# Patient Record
Sex: FEMALE | Race: WHITE | NOT HISPANIC OR LATINO | Employment: OTHER | URBAN - METROPOLITAN AREA
[De-identification: names, ages, dates, MRNs, and addresses within clinical notes are randomized per-mention and may not be internally consistent; named-entity substitution may affect disease eponyms.]

---

## 2017-01-01 DIAGNOSIS — N28.9 RENAL INSUFFICIENCY: Primary | ICD-10-CM

## 2017-01-10 RX ORDER — FUROSEMIDE 20 MG/1
TABLET ORAL
Qty: 45 TABLET | Refills: 0 | Status: SHIPPED | OUTPATIENT
Start: 2017-01-10 | End: 2017-06-06

## 2017-02-15 ENCOUNTER — TRANSCRIBE ORDERS (OUTPATIENT)
Dept: ADMINISTRATIVE | Facility: HOSPITAL | Age: 82
End: 2017-02-15

## 2017-02-15 DIAGNOSIS — N18.30 CHRONIC KIDNEY DISEASE, STAGE III (MODERATE) (HCC): Primary | ICD-10-CM

## 2017-02-21 ENCOUNTER — HOSPITAL ENCOUNTER (OUTPATIENT)
Dept: ULTRASOUND IMAGING | Facility: HOSPITAL | Age: 82
Discharge: HOME | End: 2017-02-21
Attending: INTERNAL MEDICINE | Admitting: INTERNAL MEDICINE

## 2017-02-21 DIAGNOSIS — N18.30 CHRONIC KIDNEY DISEASE, STAGE III (MODERATE) (HCC): ICD-10-CM

## 2017-02-21 PROCEDURE — 76775 US EXAM ABDO BACK WALL LIM: CPT

## 2017-02-26 RX ORDER — FUROSEMIDE 20 MG/1
TABLET ORAL
Qty: 45 TABLET | Refills: 0 | Status: SHIPPED | OUTPATIENT
Start: 2017-02-26 | End: 2017-06-06

## 2017-06-06 ENCOUNTER — OFFICE VISIT (OUTPATIENT)
Dept: CARDIOLOGY | Facility: CLINIC | Age: 82
End: 2017-06-06

## 2017-06-06 VITALS
SYSTOLIC BLOOD PRESSURE: 122 MMHG | BODY MASS INDEX: 24.27 KG/M2 | HEART RATE: 57 BPM | DIASTOLIC BLOOD PRESSURE: 84 MMHG | WEIGHT: 151 LBS | HEIGHT: 66 IN

## 2017-06-06 DIAGNOSIS — R42 DIZZINESS: ICD-10-CM

## 2017-06-06 DIAGNOSIS — I34.0 NON-RHEUMATIC MITRAL REGURGITATION: ICD-10-CM

## 2017-06-06 DIAGNOSIS — E78.2 MIXED HYPERLIPIDEMIA: ICD-10-CM

## 2017-06-06 DIAGNOSIS — T73.3XXD FATIGUE DUE TO EXCESSIVE EXERTION, SUBSEQUENT ENCOUNTER: ICD-10-CM

## 2017-06-06 DIAGNOSIS — R07.2 PRECORDIAL PAIN: ICD-10-CM

## 2017-06-06 DIAGNOSIS — R09.02 HYPOXEMIA: ICD-10-CM

## 2017-06-06 DIAGNOSIS — I42.9 CARDIOMYOPATHY (HCC): ICD-10-CM

## 2017-06-06 DIAGNOSIS — I25.10 CAD IN NATIVE ARTERY: ICD-10-CM

## 2017-06-06 DIAGNOSIS — I27.81 COR PULMONALE (HCC): ICD-10-CM

## 2017-06-06 DIAGNOSIS — I36.1 NON-RHEUMATIC TRICUSPID VALVE INSUFFICIENCY: ICD-10-CM

## 2017-06-06 DIAGNOSIS — I48.91 ATRIAL FIBRILLATION AND FLUTTER (HCC): Primary | ICD-10-CM

## 2017-06-06 DIAGNOSIS — I35.0 NONRHEUMATIC AORTIC VALVE STENOSIS: ICD-10-CM

## 2017-06-06 DIAGNOSIS — I24.9 ACUTE ISCHEMIC HEART DISEASE (HCC): ICD-10-CM

## 2017-06-06 DIAGNOSIS — E87.5 HYPERKALEMIA: ICD-10-CM

## 2017-06-06 DIAGNOSIS — I50.42 CHRONIC COMBINED SYSTOLIC AND DIASTOLIC CONGESTIVE HEART FAILURE (HCC): ICD-10-CM

## 2017-06-06 DIAGNOSIS — I48.21 PERMANENT ATRIAL FIBRILLATION (HCC): ICD-10-CM

## 2017-06-06 DIAGNOSIS — R26.89 IMBALANCE: ICD-10-CM

## 2017-06-06 DIAGNOSIS — I48.92 ATRIAL FIBRILLATION AND FLUTTER (HCC): Primary | ICD-10-CM

## 2017-06-06 DIAGNOSIS — I44.7 LEFT BUNDLE BRANCH BLOCK (LBBB): ICD-10-CM

## 2017-06-06 DIAGNOSIS — I50.33 ACUTE ON CHRONIC DIASTOLIC HEART FAILURE (HCC): ICD-10-CM

## 2017-06-06 DIAGNOSIS — I25.118 CORONARY ARTERY DISEASE INVOLVING NATIVE CORONARY ARTERY OF NATIVE HEART WITH OTHER FORM OF ANGINA PECTORIS (HCC): ICD-10-CM

## 2017-06-06 DIAGNOSIS — R42 FEELING FAINT: ICD-10-CM

## 2017-06-06 DIAGNOSIS — G47.33 OBSTRUCTIVE SLEEP APNEA SYNDROME: ICD-10-CM

## 2017-06-06 PROBLEM — I10 HYPERTENSION: Status: ACTIVE | Noted: 2017-06-06

## 2017-06-06 PROBLEM — R77.8 ELEVATED TROPONIN I LEVEL: Status: ACTIVE | Noted: 2017-03-29

## 2017-06-06 PROBLEM — Z51.81 ENCOUNTER FOR THERAPEUTIC DRUG LEVEL MONITORING: Status: ACTIVE | Noted: 2017-03-28

## 2017-06-06 PROBLEM — J96.01 ACUTE RESPIRATORY FAILURE WITH HYPOXIA (HCC): Status: ACTIVE | Noted: 2017-03-28

## 2017-06-06 PROBLEM — N18.9 CHRONIC KIDNEY DISEASE: Status: ACTIVE | Noted: 2017-01-01

## 2017-06-06 PROBLEM — R79.89 OTHER SPECIFIED ABNORMAL FINDINGS OF BLOOD CHEMISTRY: Status: ACTIVE | Noted: 2017-03-29

## 2017-06-06 PROCEDURE — 99214 OFFICE O/P EST MOD 30 MIN: CPT | Performed by: INTERNAL MEDICINE

## 2017-06-06 PROCEDURE — 93000 ELECTROCARDIOGRAM COMPLETE: CPT | Performed by: INTERNAL MEDICINE

## 2017-06-06 RX ORDER — ESCITALOPRAM OXALATE 5 MG/1
TABLET ORAL
COMMUNITY
Start: 2017-05-15

## 2017-06-06 NOTE — PROGRESS NOTES
[facility]  Cardiology Office Visit Note        Subjective:     Encounter Date:2017      Patient ID: Felecia Hinkle   Age: 89 y.o.  Sex: female  :  1925  MRN: [id number]             Date of Office Visit: 2017  Encounter Provider: Jeffy Valdez MD  Place of Service: [facility]     .    Chief Complaint:  History of Present Illness    The following portions of the patient's history were reviewed and updated as appropriate: allergies, current medications, past family history, past medical history, past social history, past surgical history and problem list.    Review of Systems   Constitution: Negative for chills, fever and weight gain.   HENT: Negative for congestion, headaches, hearing loss and sore throat.    Eyes: Negative for blurred vision and double vision.   Cardiovascular: Positive for leg swelling. Negative for chest pain, claudication, cyanosis, dyspnea on exertion, irregular heartbeat, near-syncope, orthopnea, palpitations, paroxysmal nocturnal dyspnea and syncope.   Respiratory: Positive for cough, shortness of breath, snoring and wheezing.    Endocrine: Negative for cold intolerance.   Hematologic/Lymphatic: Negative for adenopathy. Does not bruise/bleed easily.   Skin: Negative for rash.   Musculoskeletal: Negative for back pain.   Gastrointestinal: Positive for constipation. Negative for abdominal pain, change in bowel habit, diarrhea, nausea and vomiting.   Genitourinary: Negative for dysuria, frequency, hematuria and hesitancy.   Neurological: Positive for dizziness and loss of balance. Negative for disturbances in coordination, excessive daytime sleepiness, focal weakness and light-headedness. Numbness: Feet to Knees.   Psychiatric/Behavioral: Positive for depression. Negative for memory loss. The patient is not nervous/anxious.    Allergic/Immunologic: Negative for hives.         ECG 12 Lead  Date/Time: 2017  11:29 AM  Performed by: IVELISSE PASTRANA JR  Authorized by: IVELISSE PASTRANA JR   Comparison: compared with previous ECG   Similar to previous ECG  Rhythm: atrial fibrillation  BPM: 72  Conduction: left bundle branch block  Conduction comments: Left axis deviation  Clinical impression: abnormal ECG                       HPI     The patient is a 89-year-old white female with history of LVEF 40%, moderate to severe mitral regurgitation, severe pulmonary hypertension, moderate aortic stenosis, chronic atrial fibrillation on warfarin, hyperlipidemia, hypertension, with recent coronary stent who presents for  cardiac follow-up.  I last saw her in the office in December 2016.  At that time she was taking nitroglycerin rarely for epigastric/subxiphoid pain with relief.  Since then, she has moved in with one of her nieces, as she does not take care of herself when left alone.  When the [facility] visiting her daughters, the patient will not walk at all and therefore sustained massive edema of legs to the point of weeping.  She went to the emergency room at [facility] and March 2017 was treated with IV Lasix and lost 11 pounds.  Since then, she hasn't gained any further weight.  Next    Cardiac review systems: She has positional vertigo.  It hasn't thrown Dr. Erickson that she doesn't have a inner disorder in the past.  She is troubled with her balance.  She does have some spinning but not as bad as in the past.  She is not orthostatic by recent blood pressure and pulse.    She denies chest pain but does have a knot in her epigastrium and subxiphoid area relieved by nitroglycerin sublingua        The patient is very anxious when her nieces gone.  She was on Lexapro for a couple months ago and see me doing a little better.    No palpitations or syncope.    Cardiac risk factors: No diabetes.  Positive hypertension and hyperlipidemia.  No smoking.  Positive family history really CAD as father had MI at age 62 and brother  bypass in his 60s.              Past Medical History:   Diagnosis Date   • Acute myocardial infarction    • Congestive heart failure    • Malignant neoplasm of breast    • Vertigo        Past Surgical History:   Procedure Laterality Date   • CATARACT EXTRACTION     • CORONARY ANGIOPLASTY WITH STENT PLACEMENT     • THYROID SURGERY      SUB-TOTAL THYROIDECTOMY       Social History     Social History   • Marital status:      Spouse name: N/A   • Number of children: N/A   • Years of education: N/A     Occupational History   • Not on file.     Social History Main Topics   • Smoking status: Former Smoker   • Smokeless tobacco: Not on file   • Alcohol use No   • Drug use: Not on file   • Sexual activity: Not on file     Other Topics Concern   • Not on file     Social History Narrative   • No narrative on file       Family History   Problem Relation Age of Onset   • Thyroid disease Other    • Stroke Other      CEREBROVASCULAR ACCIDENT           Scheduled Meds:  Current Outpatient Prescriptions on File Prior to Visit   Medication Sig Dispense Refill   • acetaminophen (TYLENOL) 325 MG tablet Take 500 mg by mouth 3 (Three) Times a Day. TAKE 1 TO 2 TABLETS EVERY 6 HOURS AS NEEDED.     • aluminum-magnesium hydroxide-simethicone (MAALOX/MYLANTA) 200-200-20 MG/5ML suspension Take by mouth.     • Ascorbic Acid (VITAMIN C PO) Take by mouth.     • Calcium Carb-Cholecalciferol (CALCIUM 600 + D PO) Take 1 tablet by mouth 2 (two) times a day.     • carvedilol (COREG) 12.5 MG tablet Take 1 tablet (12.5 mg total) by mouth 2 (two) times a day.     • celecoxib (CeleBREX) 200 MG capsule Take 1 capsule by mouth daily.     • clopidogrel (PLAVIX) 75 MG tablet Take 1 tablet by mouth daily.     • furosemide (LASIX) 20 MG tablet Take 1 tablet by mouth daily. 20mg in am and 10mg at night      • Glucosamine-Chondroit-Vit C-Mn (GLUCOSAMINE CHONDR 1500 COMPLX) capsule Take 1 capsule by mouth 2 (two) times a day.     • nitroglycerin (NITROSTAT)  "0.4 MG SL tablet Place under the tongue.     • rosuvastatin (CRESTOR) 10 MG tablet Take by mouth once a week.       • spironolactone (ALDACTONE) 25 MG tablet Take 0.5 tablets (12.5 mg total) by mouth daily. 45 tablet 2   • warfarin (COUMADIN) 1 MG tablet Take 1mg Monday, Wednesday, Friday. Take 1.5mg all other days or as instructed for INR 2-3. 60 tablet 3   • warfarin (COUMADIN) 2 MG tablet Take 1 tablet by mouth daily.     • [DISCONTINUED] carvedilol (COREG) 25 MG tablet TAKE ONE TABLET BY MOUTH TWICE A  tablet 0   • [DISCONTINUED] Cyanocobalamin (VITAMIN B-12 PO) Take by mouth.     • [DISCONTINUED] diphenhydrAMINE (BENADRYL) 25 mg capsule Take by mouth. TAKE 4 NIGHTS PER WEEK.     • [DISCONTINUED] furosemide (LASIX) 20 MG tablet TAKE 1 1/2 TABLETS BY MOUTH DAILY 60 tablet 0   • [DISCONTINUED] furosemide (LASIX) 20 MG tablet TAKE ONE AND ONE-HALF TABLET BY MOUTH DAILY 45 tablet 0   • [DISCONTINUED] furosemide (LASIX) 20 MG tablet TAKE ONE AND ONE-HALF TABLET BY MOUTH DAILY 45 tablet 0   • [DISCONTINUED] furosemide (LASIX) 20 MG tablet TAKE ONE AND ONE-HALF TABLET BY MOUTH DAILY 45 tablet 0   • [DISCONTINUED] furosemide (LASIX) 20 MG tablet TAKE ONE AND ONE-HALF TABLET BY MOUTH DAILY 45 tablet 0   • [DISCONTINUED] ranolazine (RANEXA) 500 MG 12 hr tablet Take 1 tablet (500 mg total) by mouth 2 (two) times a day. 60 tablet 5     No current facility-administered medications on file prior to visit.        /84  Pulse 57  Ht 66\" (167.6 cm)  Wt 151 lb (68.5 kg)  BMI 24.37 kg/m2    Objective:     Physical Exam   Constitutional: She is oriented to person, place, and time. She appears well-developed and well-nourished. No distress.   HENT:   Head: Normocephalic and atraumatic.   Right Ear: External ear normal.   Left Ear: External ear normal.   Mouth/Throat: Oropharynx is clear and moist. No oropharyngeal exudate.   Eyes: Conjunctivae and EOM are normal. Pupils are equal, round, and reactive to light. No " scleral icterus.   Neck: Normal range of motion. Neck supple. No JVD present. No tracheal deviation present. No thyromegaly present.   Cardiovascular: Normal rate, S1 normal, S2 normal, normal heart sounds and intact distal pulses.  An irregularly irregular rhythm present. PMI is not displaced.  Exam reveals no gallop, no distant heart sounds, no friction rub and no decreased pulses.    No murmur heard.  Pulmonary/Chest: Effort normal and breath sounds normal. No accessory muscle usage. No respiratory distress. She has no wheezes. She has no rales. She exhibits no tenderness.   Abdominal: Soft. Bowel sounds are normal. She exhibits no distension and no mass. There is no tenderness. There is no rebound and no guarding.   Musculoskeletal: Normal range of motion. She exhibits no edema, tenderness or deformity.   Lymphadenopathy:     She has no cervical adenopathy.   Neurological: She is alert and oriented to person, place, and time. She has normal reflexes. No cranial nerve deficit. Coordination normal.   Skin: Skin is dry. No rash noted. She is not diaphoretic. No erythema. No pallor.   Psychiatric: She has a normal mood and affect.             Lab Review:               Lab Review:         Lab Review     Lab Results   Component Value Date    CHOL 161 12/20/2016     Lab Results   Component Value Date    HDL 46 12/20/2016    HDL 48 08/26/2014     Lab Results   Component Value Date    LDL 85 08/26/2014     Lab Results   Component Value Date    TRIG 109 12/20/2016    TRIG 109 08/26/2014     No components found for: CHOLHDL  Lab Results   Component Value Date    GLUCOSE 69 12/20/2016    BUN 40 (H) 12/20/2016    CREATININE 1.94 (H) 12/20/2016    EGFRIFNONA 24 (L) 12/20/2016    BCR 20.6 12/20/2016    CO2 24.3 12/20/2016    CALCIUM 10.0 (H) 12/20/2016    ALBUMIN 4.30 12/20/2016    LABIL2 1.7 12/20/2016    AST 16 12/20/2016    ALT 5 12/20/2016     Lab Results   Component Value Date    GLUCOSE 69 12/20/2016    CALCIUM 10.0 (H)  12/20/2016     12/20/2016    K 5.7 (H) 12/20/2016    CO2 24.3 12/20/2016    CL 99 12/20/2016    BUN 40 (H) 12/20/2016    CREATININE 1.94 (H) 12/20/2016    EGFRIFNONA 24 (L) 12/20/2016    BCR 20.6 12/20/2016    ANIONGAP 13.7 12/20/2016     Lab Results   Component Value Date    WBC 5.57 12/20/2016    HGB 12.3 12/20/2016    HCT 38.9 12/20/2016    .4 (H) 12/20/2016     12/20/2016     Lab Results   Component Value Date    DDIMER 1468 (H) 08/26/2014     Lab Results   Component Value Date    TSH 1.620 12/20/2016     Lab Results   Component Value Date    CKTOTAL 51 12/20/2016     No results found for: DIGOXIN  Lab Results   Component Value Date    CKTOTAL 51 12/20/2016    CKMB 2.61 12/20/2016    TROPONINT 0.04 (H) 03/14/2015     Lab Results   Component Value Date    INR 2.4 (H) 01/26/2016    INR 2.9 (H) 11/26/2015    INR 2.5 (H) 03/15/2015    PROTIME 26.2 (H) 01/26/2016    PROTIME 30.6 (H) 11/26/2015    PROTIME 28.0 (H) 03/15/2015     CrCl cannot be calculated (Patient's most recent sCr result is older than the maximum 30 days allowed.).    Assessment:          Diagnosis Plan   1. Atrial fibrillation and flutter  ECG 12 Lead    Adult Transthoracic Echo Complete    CBC & Differential    CK    CK-MB    Comprehensive Metabolic Panel    D-dimer, Quantitative    Magnesium    proBNP    Sedimentation Rate    T3, Free    TSH    T4, free    Troponin T   2. CAD in native artery  ECG 12 Lead   3. Acute ischemic heart disease     4. Acute on chronic diastolic heart failure     5. Nonrheumatic aortic valve stenosis  Adult Transthoracic Echo Complete    CBC & Differential    CK    CK-MB    Comprehensive Metabolic Panel    D-dimer, Quantitative    Magnesium    Lipid Panel    proBNP    Sedimentation Rate    T3, Free    TSH    T4, free    Troponin T   6. Cardiomyopathy     7. Chronic combined systolic and diastolic congestive heart failure     8. Cor pulmonale     9. Coronary artery disease involving native coronary  artery of native heart with other form of angina pectoris     10. Left bundle branch block (LBBB)     11. Non-rheumatic mitral regurgitation     12. Non-rheumatic tricuspid valve insufficiency     13. Permanent atrial fibrillation  Adult Transthoracic Echo Complete    CBC & Differential    CK    CK-MB    Comprehensive Metabolic Panel    D-dimer, Quantitative    Magnesium    Lipid Panel    proBNP    Sedimentation Rate    T3, Free    TSH    T4, free    Troponin T   14. Precordial pain     15. Obstructive sleep apnea syndrome     16. Dizziness     17. Fatigue due to excessive exertion, subsequent encounter     18. Feeling faint     19. Hyperkalemia     20. Hypoxemia     21. Imbalance     22. Mixed hyperlipidemia  Lipid Panel          Assessment and Plan:    Felecia was seen today for atrial fibrillation, coronary artery disease and congestive heart failure.    Diagnoses and all orders for this visit:    Atrial fibrillation and flutter  -     ECG 12 Lead  -     Adult Transthoracic Echo Complete  -     CBC & Differential  -     CK  -     CK-MB  -     Comprehensive Metabolic Panel  -     D-dimer, Quantitative  -     Magnesium  -     proBNP  -     Sedimentation Rate  -     T3, Free  -     TSH  -     T4, free  -     Troponin T    CAD in native artery  -     ECG 12 Lead    Acute ischemic heart disease    Acute on chronic diastolic heart failure    Nonrheumatic aortic valve stenosis  -     Adult Transthoracic Echo Complete  -     CBC & Differential  -     CK  -     CK-MB  -     Comprehensive Metabolic Panel  -     D-dimer, Quantitative  -     Magnesium  -     Lipid Panel  -     proBNP  -     Sedimentation Rate  -     T3, Free  -     TSH  -     T4, free  -     Troponin T    Cardiomyopathy    Chronic combined systolic and diastolic congestive heart failure    Cor pulmonale    Coronary artery disease involving native coronary artery of native heart with other form of angina pectoris    Left bundle branch block  (LBBB)    Non-rheumatic mitral regurgitation    Non-rheumatic tricuspid valve insufficiency    Permanent atrial fibrillation  -     Adult Transthoracic Echo Complete  -     CBC & Differential  -     CK  -     CK-MB  -     Comprehensive Metabolic Panel  -     D-dimer, Quantitative  -     Magnesium  -     Lipid Panel  -     proBNP  -     Sedimentation Rate  -     T3, Free  -     TSH  -     T4, free  -     Troponin T    Precordial pain    Obstructive sleep apnea syndrome    Dizziness    Fatigue due to excessive exertion, subsequent encounter    Feeling faint    Hyperkalemia    Hypoxemia    Imbalance    Mixed hyperlipidemia  -     Lipid Panel    The patient's legs are much better, with only mild edema wearing compression stockings.  Her jugular venous pressure is normal and I don't think she is vomiting overload.  she does have crackles in her bases and likely has an element of COPD particularly as recent chest x-ray showed chronic lumbar changes.  She does do pursed lip breathing on occasion.    She does complain of pain in her epigastrium/subxiphoid area relieved by nitroglycerin sublingual.  I told her she can take this.  I will also prescribe her Pepcid 40 moderate MDI to see if that relieves epigastric pain.  She does have a lot of indigestion.  She also takes Tums with relief.    I told the patient that she needs to exercise [place] today ran this sitting in a chair or she will regain the pedal edema.    She does not have [place] food.  Her sodium levels low and her blood, according to the daughter.    Atrial fibrillation rate is 72 today, controlled.  Remains on Coumadin.    I will obtain echocardiogram to evaluate her recent admission to emergency room with heart failure.  I will also need to obtain blood work including proBNP as it has been done since December 2016.  She check her potassium and sodium level at that time too..      A total of 25 minutes was spent in the care of this patient, including at least 13  minutes face-to-face with the patient.    I not only counseled the patient today on the significant factors noted in the assessment and plan, but I also recommended that the patient reduce salt and saturated animal fat intake in diet, as well as to perform scheduled exercise on a regular basis.      Plan:                  06/06/2017  1:53 PM  MD Jeffy Sequeira MD  6/6/2017, 1:53 PM    EMR Dragon/Transcription disclaimer:   Much of this encounter note is an electronic transcription/translation of spoken language to printed text. The electronic translation of spoken language may permit erroneous, or at times, nonsensical words or phrases to be inadvertently transcribed; Although I have reviewed the note for such errors, some may still exist.

## 2017-06-14 ENCOUNTER — HOSPITAL ENCOUNTER (OUTPATIENT)
Dept: CARDIOLOGY | Facility: HOSPITAL | Age: 82
Discharge: HOME | End: 2017-06-14

## 2017-06-14 ENCOUNTER — HOSPITAL ENCOUNTER (OUTPATIENT)
Dept: CARDIOLOGY | Facility: HOSPITAL | Age: 82
Discharge: HOME | End: 2017-06-14
Attending: INTERNAL MEDICINE | Admitting: INTERNAL MEDICINE

## 2017-06-14 VITALS
BODY MASS INDEX: 24.27 KG/M2 | WEIGHT: 151 LBS | SYSTOLIC BLOOD PRESSURE: 122 MMHG | HEIGHT: 66 IN | DIASTOLIC BLOOD PRESSURE: 84 MMHG

## 2017-06-14 LAB
ALBUMIN SERPL-MCNC: 4 G/DL (ref 3.5–5.2)
ALBUMIN/GLOB SERPL: 1.5 G/DL
ALP SERPL-CCNC: 87 U/L (ref 39–117)
ALT SERPL W P-5'-P-CCNC: 9 U/L (ref 1–33)
ANION GAP SERPL CALCULATED.3IONS-SCNC: 14.3 MMOL/L
AST SERPL-CCNC: 14 U/L (ref 1–32)
BASOPHILS # BLD AUTO: 0.03 10*3/MM3 (ref 0–0.2)
BASOPHILS NFR BLD AUTO: 0.6 % (ref 0–1.5)
BH CV ECHO MEAS - ACS: 1.7 CM
BH CV ECHO MEAS - AI DEC SLOPE: 209 CM/SEC^2
BH CV ECHO MEAS - AI MAX PG: 48.7 MMHG
BH CV ECHO MEAS - AI MAX VEL: 349 CM/SEC
BH CV ECHO MEAS - AI P1/2T: 489.1 MSEC
BH CV ECHO MEAS - AO MAX PG (FULL): 3.3 MMHG
BH CV ECHO MEAS - AO MAX PG: 5.2 MMHG
BH CV ECHO MEAS - AO MEAN PG (FULL): 2 MMHG
BH CV ECHO MEAS - AO MEAN PG: 3 MMHG
BH CV ECHO MEAS - AO ROOT AREA (BSA CORRECTED): 1.9
BH CV ECHO MEAS - AO ROOT AREA: 8.6 CM^2
BH CV ECHO MEAS - AO ROOT DIAM: 3.3 CM
BH CV ECHO MEAS - AO V2 MAX: 114 CM/SEC
BH CV ECHO MEAS - AO V2 MEAN: 83.2 CM/SEC
BH CV ECHO MEAS - AO V2 VTI: 20.2 CM
BH CV ECHO MEAS - AVA(I,A): 2 CM^2
BH CV ECHO MEAS - AVA(I,D): 2 CM^2
BH CV ECHO MEAS - AVA(V,A): 1.9 CM^2
BH CV ECHO MEAS - AVA(V,D): 1.9 CM^2
BH CV ECHO MEAS - BSA(HAYCOCK): 1.8 M^2
BH CV ECHO MEAS - BSA: 1.8 M^2
BH CV ECHO MEAS - BZI_BMI: 24.4 KILOGRAMS/M^2
BH CV ECHO MEAS - BZI_METRIC_HEIGHT: 167.6 CM
BH CV ECHO MEAS - BZI_METRIC_WEIGHT: 68.5 KG
BH CV ECHO MEAS - CONTRAST EF 4CH: 59.8 ML/M^2
BH CV ECHO MEAS - EDV(CUBED): 74.1 ML
BH CV ECHO MEAS - EDV(MOD-SP4): 82 ML
BH CV ECHO MEAS - EDV(TEICH): 78.6 ML
BH CV ECHO MEAS - EF(CUBED): 63.6 %
BH CV ECHO MEAS - EF(MOD-SP4): 59.8 %
BH CV ECHO MEAS - EF(TEICH): 55.5 %
BH CV ECHO MEAS - ESV(CUBED): 27 ML
BH CV ECHO MEAS - ESV(MOD-SP4): 33 ML
BH CV ECHO MEAS - ESV(TEICH): 35 ML
BH CV ECHO MEAS - FS: 28.6 %
BH CV ECHO MEAS - IVS/LVPW: 1
BH CV ECHO MEAS - IVSD: 1.2 CM
BH CV ECHO MEAS - LA DIMENSION: 4.9 CM
BH CV ECHO MEAS - LA/AO: 1.5
BH CV ECHO MEAS - LAT PEAK E' VEL: 11.7 CM/SEC
BH CV ECHO MEAS - LV DIASTOLIC VOL/BSA (35-75): 46.2 ML/M^2
BH CV ECHO MEAS - LV MASS(C)D: 178.2 GRAMS
BH CV ECHO MEAS - LV MASS(C)DI: 100.4 GRAMS/M^2
BH CV ECHO MEAS - LV MAX PG: 1.9 MMHG
BH CV ECHO MEAS - LV MEAN PG: 1 MMHG
BH CV ECHO MEAS - LV SYSTOLIC VOL/BSA (12-30): 18.6 ML/M^2
BH CV ECHO MEAS - LV V1 MAX: 68.7 CM/SEC
BH CV ECHO MEAS - LV V1 MEAN: 52.4 CM/SEC
BH CV ECHO MEAS - LV V1 VTI: 12.8 CM
BH CV ECHO MEAS - LVIDD: 4.2 CM
BH CV ECHO MEAS - LVIDS: 3 CM
BH CV ECHO MEAS - LVLD AP4: 6.6 CM
BH CV ECHO MEAS - LVLS AP4: 5.9 CM
BH CV ECHO MEAS - LVOT AREA (M): 3.1 CM^2
BH CV ECHO MEAS - LVOT AREA: 3.1 CM^2
BH CV ECHO MEAS - LVOT DIAM: 2 CM
BH CV ECHO MEAS - LVPWD: 1.2 CM
BH CV ECHO MEAS - MED PEAK E' VEL: 5.2 CM/SEC
BH CV ECHO MEAS - MR MAX PG: 87.2 MMHG
BH CV ECHO MEAS - MR MAX VEL: 467 CM/SEC
BH CV ECHO MEAS - MV A DUR: 0.2 SEC
BH CV ECHO MEAS - MV A MAX VEL: 47.5 CM/SEC
BH CV ECHO MEAS - MV DEC SLOPE: 709 CM/SEC^2
BH CV ECHO MEAS - MV DEC TIME: 0.12 SEC
BH CV ECHO MEAS - MV E MAX VEL: 113 CM/SEC
BH CV ECHO MEAS - MV E/A: 2.4
BH CV ECHO MEAS - MV MAX PG: 5.5 MMHG
BH CV ECHO MEAS - MV MEAN PG: 1 MMHG
BH CV ECHO MEAS - MV P1/2T MAX VEL: 120 CM/SEC
BH CV ECHO MEAS - MV P1/2T: 49.6 MSEC
BH CV ECHO MEAS - MV V2 MAX: 117 CM/SEC
BH CV ECHO MEAS - MV V2 MEAN: 44.5 CM/SEC
BH CV ECHO MEAS - MV V2 VTI: 35.5 CM
BH CV ECHO MEAS - MVA P1/2T LCG: 1.8 CM^2
BH CV ECHO MEAS - MVA(P1/2T): 4.4 CM^2
BH CV ECHO MEAS - MVA(VTI): 1.1 CM^2
BH CV ECHO MEAS - PA MAX PG (FULL): 2.1 MMHG
BH CV ECHO MEAS - PA MAX PG: 2.5 MMHG
BH CV ECHO MEAS - PA V2 MAX: 78.5 CM/SEC
BH CV ECHO MEAS - PULM A REVS DUR: 0.14 SEC
BH CV ECHO MEAS - PULM A REVS VEL: 21.8 CM/SEC
BH CV ECHO MEAS - PULM DIAS VEL: 51.9 CM/SEC
BH CV ECHO MEAS - PULM S/D: 0.83
BH CV ECHO MEAS - PULM SYS VEL: 43 CM/SEC
BH CV ECHO MEAS - PVA(V,A): 1.4 CM^2
BH CV ECHO MEAS - PVA(V,D): 1.4 CM^2
BH CV ECHO MEAS - QP/QS: 0.49
BH CV ECHO MEAS - RAP SYSTOLE: 10 MMHG
BH CV ECHO MEAS - RV MAX PG: 0.35 MMHG
BH CV ECHO MEAS - RV MEAN PG: 0 MMHG
BH CV ECHO MEAS - RV V1 MAX: 29.6 CM/SEC
BH CV ECHO MEAS - RV V1 MEAN: 20.4 CM/SEC
BH CV ECHO MEAS - RV V1 VTI: 5.2 CM
BH CV ECHO MEAS - RVDD: 3.3 CM
BH CV ECHO MEAS - RVOT AREA: 3.8 CM^2
BH CV ECHO MEAS - RVOT DIAM: 2.2 CM
BH CV ECHO MEAS - RVSP: 82.3 MMHG
BH CV ECHO MEAS - SI(AO): 97.3 ML/M^2
BH CV ECHO MEAS - SI(CUBED): 26.5 ML/M^2
BH CV ECHO MEAS - SI(LVOT): 22.7 ML/M^2
BH CV ECHO MEAS - SI(MOD-SP4): 27.6 ML/M^2
BH CV ECHO MEAS - SI(TEICH): 24.6 ML/M^2
BH CV ECHO MEAS - SV(AO): 172.8 ML
BH CV ECHO MEAS - SV(CUBED): 47.1 ML
BH CV ECHO MEAS - SV(LVOT): 40.2 ML
BH CV ECHO MEAS - SV(MOD-SP4): 49 ML
BH CV ECHO MEAS - SV(RVOT): 19.7 ML
BH CV ECHO MEAS - SV(TEICH): 43.6 ML
BH CV ECHO MEAS - TAPSE (>1.6): 1.6 CM2
BH CV ECHO MEAS - TR MAX VEL: 425 CM/SEC
BH CV XLRA - RV BASE: 3.8 CM
BH CV XLRA - RV LENGTH: 5.5 CM
BH CV XLRA - RV MID: 3.4 CM
BH CV XLRA - TDI S': 7.4 CM/SEC
BILIRUB SERPL-MCNC: 0.6 MG/DL (ref 0.1–1.2)
BUN BLD-MCNC: 37 MG/DL (ref 8–23)
BUN/CREAT SERPL: 19.4 (ref 7–25)
CALCIUM SPEC-SCNC: 9.8 MG/DL (ref 8.2–9.6)
CHLORIDE SERPL-SCNC: 96 MMOL/L (ref 98–107)
CHOLEST SERPL-MCNC: 144 MG/DL (ref 0–200)
CK MB SERPL-CCNC: 2.2 NG/ML
CK SERPL-CCNC: 31 U/L (ref 20–180)
CO2 SERPL-SCNC: 21.7 MMOL/L (ref 22–29)
CREAT BLD-MCNC: 1.91 MG/DL (ref 0.57–1)
D DIMER PPP FEU-MCNC: 2.72 MCGFEU/ML (ref 0–0.49)
DEPRECATED RDW RBC AUTO: 60.7 FL (ref 37–54)
EOSINOPHIL # BLD AUTO: 0.24 10*3/MM3 (ref 0–0.7)
EOSINOPHIL NFR BLD AUTO: 4.5 % (ref 0.3–6.2)
ERYTHROCYTE [DISTWIDTH] IN BLOOD BY AUTOMATED COUNT: 18.5 % (ref 11.7–13)
ERYTHROCYTE [SEDIMENTATION RATE] IN BLOOD: 15 MM/HR (ref 0–30)
GFR SERPL CREATININE-BSD FRML MDRD: 25 ML/MIN/1.73
GLOBULIN UR ELPH-MCNC: 2.7 GM/DL
GLUCOSE BLD-MCNC: 77 MG/DL (ref 65–99)
HCT VFR BLD AUTO: 35.5 % (ref 35.6–45.5)
HDLC SERPL-MCNC: 47 MG/DL (ref 40–60)
HGB BLD-MCNC: 11.7 G/DL (ref 11.9–15.5)
IMM GRANULOCYTES # BLD: 0 10*3/MM3 (ref 0–0.03)
IMM GRANULOCYTES NFR BLD: 0 % (ref 0–0.5)
LDLC SERPL CALC-MCNC: 85 MG/DL (ref 0–100)
LDLC/HDLC SERPL: 1.8 {RATIO}
LEFT ATRIUM VOLUME INDEX: 94.4 ML/M2
LV EF 2D ECHO EST: 50 %
LYMPHOCYTES # BLD AUTO: 0.92 10*3/MM3 (ref 0.9–4.8)
LYMPHOCYTES NFR BLD AUTO: 17.3 % (ref 19.6–45.3)
MAGNESIUM SERPL-MCNC: 2.2 MG/DL (ref 1.7–2.3)
MCH RBC QN AUTO: 29.3 PG (ref 26.9–32)
MCHC RBC AUTO-ENTMCNC: 33 G/DL (ref 32.4–36.3)
MCV RBC AUTO: 89 FL (ref 80.5–98.2)
MONOCYTES # BLD AUTO: 0.62 10*3/MM3 (ref 0.2–1.2)
MONOCYTES NFR BLD AUTO: 11.7 % (ref 5–12)
NEUTROPHILS # BLD AUTO: 3.51 10*3/MM3 (ref 1.9–8.1)
NEUTROPHILS NFR BLD AUTO: 65.9 % (ref 42.7–76)
NT-PROBNP SERPL-MCNC: 7595 PG/ML (ref 0–1800)
PLATELET # BLD AUTO: 154 10*3/MM3 (ref 140–500)
PMV BLD AUTO: 10.3 FL (ref 6–12)
POTASSIUM BLD-SCNC: 5.2 MMOL/L (ref 3.5–5.2)
PROT SERPL-MCNC: 6.7 G/DL (ref 6–8.5)
RBC # BLD AUTO: 3.99 10*6/MM3 (ref 3.9–5.2)
SODIUM BLD-SCNC: 132 MMOL/L (ref 136–145)
T3FREE SERPL-MCNC: 2.33 PG/ML (ref 2–4.4)
T4 FREE SERPL-MCNC: 1.43 NG/DL (ref 0.93–1.7)
TRIGL SERPL-MCNC: 61 MG/DL (ref 0–150)
TSH SERPL DL<=0.05 MIU/L-ACNC: 1.14 MIU/ML (ref 0.27–4.2)
VLDLC SERPL-MCNC: 12.2 MG/DL (ref 5–40)
WBC NRBC COR # BLD: 5.32 10*3/MM3 (ref 4.5–10.7)

## 2017-06-14 PROCEDURE — 93306 TTE W/DOPPLER COMPLETE: CPT | Performed by: INTERNAL MEDICINE

## 2017-06-14 PROCEDURE — 80053 COMPREHEN METABOLIC PANEL: CPT | Performed by: INTERNAL MEDICINE

## 2017-06-14 PROCEDURE — 82553 CREATINE MB FRACTION: CPT | Performed by: INTERNAL MEDICINE

## 2017-06-14 PROCEDURE — 84439 ASSAY OF FREE THYROXINE: CPT | Performed by: INTERNAL MEDICINE

## 2017-06-14 PROCEDURE — 83735 ASSAY OF MAGNESIUM: CPT | Performed by: INTERNAL MEDICINE

## 2017-06-14 PROCEDURE — 85379 FIBRIN DEGRADATION QUANT: CPT | Performed by: INTERNAL MEDICINE

## 2017-06-14 PROCEDURE — 80061 LIPID PANEL: CPT | Performed by: INTERNAL MEDICINE

## 2017-06-14 PROCEDURE — 84443 ASSAY THYROID STIM HORMONE: CPT | Performed by: INTERNAL MEDICINE

## 2017-06-14 PROCEDURE — 93306 TTE W/DOPPLER COMPLETE: CPT

## 2017-06-14 PROCEDURE — 82550 ASSAY OF CK (CPK): CPT | Performed by: INTERNAL MEDICINE

## 2017-06-14 PROCEDURE — 36415 COLL VENOUS BLD VENIPUNCTURE: CPT | Performed by: INTERNAL MEDICINE

## 2017-06-14 PROCEDURE — 85652 RBC SED RATE AUTOMATED: CPT | Performed by: INTERNAL MEDICINE

## 2017-06-14 PROCEDURE — 84481 FREE ASSAY (FT-3): CPT | Performed by: INTERNAL MEDICINE

## 2017-06-14 PROCEDURE — 85025 COMPLETE CBC W/AUTO DIFF WBC: CPT | Performed by: INTERNAL MEDICINE

## 2017-06-14 PROCEDURE — 83880 ASSAY OF NATRIURETIC PEPTIDE: CPT | Performed by: INTERNAL MEDICINE

## 2017-06-19 ENCOUNTER — TELEPHONE (OUTPATIENT)
Dept: CARDIOLOGY | Facility: CLINIC | Age: 82
End: 2017-06-19

## 2017-06-19 ENCOUNTER — TELEPHONE (OUTPATIENT)
Dept: CARDIOLOGY | Facility: HOSPITAL | Age: 82
End: 2017-06-19

## 2017-06-20 NOTE — TELEPHONE ENCOUNTER
Echocardiogram shows LVEF 50%, mild LVH, mild to moderate cor pulmonale with severe pulmonary hypertension.  Moderate mitral regurgitation and tricuspid regurgitation.  No change from previous echocardiogram In December 2016.    Labs are notable for elevated proBNP likely from right heart dysfunction and severe pulmonary hypertension and renal dysfunction.  Continue present treatment.    Excellent cholesterol profile.

## 2017-06-20 NOTE — TELEPHONE ENCOUNTER
Echocardiogram shows LVEF 50%, mild LVH, mild to moderate cor pulmonale with severe pulmonary hypertension. Moderate mitral regurgitation and tricuspid regurgitation. No change from previous echocardiogram In December 2016.     Labs are notable for elevated proBNP likely from right heart dysfunction and severe pulmonary hypertension and renal dysfunction. Continue present treatment.     Excellent cholesterol profile.    I also gave this to Syl to call patient.

## 2017-08-16 RX ORDER — WARFARIN SODIUM 2 MG/1
2 TABLET ORAL DAILY
Qty: 90 TABLET | Refills: 1 | Status: SHIPPED | OUTPATIENT
Start: 2017-08-16 | End: 2018-06-06

## 2017-09-13 LAB — INR PPP: 2.3

## 2017-09-14 ENCOUNTER — ANTICOAGULATION VISIT (OUTPATIENT)
Dept: CARDIOLOGY | Facility: CLINIC | Age: 82
End: 2017-09-14

## 2017-09-20 LAB — INR PPP: 1.8

## 2017-09-21 ENCOUNTER — ANTICOAGULATION VISIT (OUTPATIENT)
Dept: CARDIOLOGY | Facility: CLINIC | Age: 82
End: 2017-09-21

## 2017-09-27 ENCOUNTER — APPOINTMENT (OUTPATIENT)
Dept: GENERAL RADIOLOGY | Facility: HOSPITAL | Age: 82
End: 2017-09-27

## 2017-09-27 ENCOUNTER — HOSPITAL ENCOUNTER (EMERGENCY)
Facility: HOSPITAL | Age: 82
Discharge: HOME | End: 2017-09-27
Attending: EMERGENCY MEDICINE | Admitting: EMERGENCY MEDICINE

## 2017-09-27 ENCOUNTER — APPOINTMENT (OUTPATIENT)
Dept: CT IMAGING | Facility: HOSPITAL | Age: 82
End: 2017-09-27

## 2017-09-27 VITALS
SYSTOLIC BLOOD PRESSURE: 155 MMHG | TEMPERATURE: 97.7 F | DIASTOLIC BLOOD PRESSURE: 86 MMHG | RESPIRATION RATE: 18 BRPM | WEIGHT: 141 LBS | HEART RATE: 70 BPM | HEIGHT: 66 IN | OXYGEN SATURATION: 98 % | BODY MASS INDEX: 22.66 KG/M2

## 2017-09-27 DIAGNOSIS — S40.012A CONTUSION OF LEFT SHOULDER, INITIAL ENCOUNTER: ICD-10-CM

## 2017-09-27 DIAGNOSIS — S00.03XA CONTUSION OF SCALP, INITIAL ENCOUNTER: Primary | ICD-10-CM

## 2017-09-27 DIAGNOSIS — S70.02XA CONTUSION OF LEFT HIP, INITIAL ENCOUNTER: ICD-10-CM
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
## 2017-09-27 DIAGNOSIS — S60.222A CONTUSION OF LEFT HAND, INITIAL ENCOUNTER: ICD-10-CM

## 2017-09-27 LAB
INR PPP: 2.64 (ref 0.9–1.1)
PROTHROMBIN TIME: 27.3 SECONDS (ref 11.7–14.2)

## 2017-09-27 PROCEDURE — 85610 PROTHROMBIN TIME: CPT | Performed by: PHYSICIAN ASSISTANT

## 2017-09-27 PROCEDURE — 73030 X-RAY EXAM OF SHOULDER: CPT

## 2017-09-27 PROCEDURE — 73502 X-RAY EXAM HIP UNI 2-3 VIEWS: CPT

## 2017-09-27 PROCEDURE — 73130 X-RAY EXAM OF HAND: CPT

## 2017-09-27 PROCEDURE — 70450 CT HEAD/BRAIN W/O DYE: CPT

## 2017-09-27 PROCEDURE — 99284 EMERGENCY DEPT VISIT MOD MDM: CPT

## 2017-09-27 RX ORDER — ACETAMINOPHEN 500 MG
500 TABLET ORAL ONCE
Status: COMPLETED | OUTPATIENT
Start: 2017-09-27 | End: 2017-09-27

## 2017-09-27 RX ADMIN — ACETAMINOPHEN 500 MG: 500 TABLET ORAL at 15:09

## 2017-09-28 ENCOUNTER — ANTICOAGULATION VISIT (OUTPATIENT)
Dept: CARDIOLOGY | Facility: CLINIC | Age: 82
End: 2017-09-28

## 2017-09-28 ENCOUNTER — EPISODE CHANGES (OUTPATIENT)
Dept: CASE MANAGEMENT | Facility: OTHER | Age: 82
End: 2017-09-28

## 2017-10-04 ENCOUNTER — PATIENT OUTREACH (OUTPATIENT)
Dept: CASE MANAGEMENT | Facility: OTHER | Age: 82
End: 2017-10-04

## 2017-10-04 ENCOUNTER — EPISODE CHANGES (OUTPATIENT)
Dept: CASE MANAGEMENT | Facility: OTHER | Age: 82
End: 2017-10-04

## 2017-10-04 NOTE — OUTREACH NOTE
Outreach with caregiver/niece, Yareli who reports patient is back to her normal state.  Explained HRCM and discussed preventive care with caregiver, including flu vaccine she usually gets in October/November.  No questions or concerns at this time.  Will continue to monitor and outreach as needed.

## 2017-10-11 LAB — INR PPP: 2.3

## 2017-10-17 ENCOUNTER — ANTICOAGULATION VISIT (OUTPATIENT)
Dept: CARDIOLOGY | Facility: CLINIC | Age: 82
End: 2017-10-17

## 2017-10-18 ENCOUNTER — ANTICOAGULATION VISIT (OUTPATIENT)
Dept: CARDIOLOGY | Age: 82
End: 2017-10-18

## 2017-10-18 LAB — INR PPP: 2.9

## 2017-10-25 ENCOUNTER — ANTICOAGULATION VISIT (OUTPATIENT)
Dept: CARDIOLOGY | Facility: CLINIC | Age: 82
End: 2017-10-25

## 2017-10-25 LAB — INR PPP: 2.5

## 2017-11-01 LAB — INR PPP: 2.8

## 2017-11-07 ENCOUNTER — ANTICOAGULATION VISIT (OUTPATIENT)
Dept: CARDIOLOGY | Facility: CLINIC | Age: 82
End: 2017-11-07

## 2017-11-08 ENCOUNTER — ANTICOAGULATION VISIT (OUTPATIENT)
Dept: CARDIOLOGY | Facility: CLINIC | Age: 82
End: 2017-11-08

## 2017-11-08 LAB — INR PPP: 2.3

## 2017-11-13 DIAGNOSIS — I48.20 CHRONIC ATRIAL FIBRILLATION (HCC): ICD-10-CM

## 2017-11-13 RX ORDER — WARFARIN SODIUM 1 MG/1
TABLET ORAL
Qty: 60 TABLET | Refills: 3 | Status: SHIPPED | OUTPATIENT
Start: 2017-11-13 | End: 2017-11-15 | Stop reason: SDUPTHER

## 2017-11-15 ENCOUNTER — ANTICOAGULATION VISIT (OUTPATIENT)
Dept: CARDIOLOGY | Facility: CLINIC | Age: 82
End: 2017-11-15

## 2017-11-15 LAB — INR PPP: 3.1

## 2017-11-15 RX ORDER — WARFARIN SODIUM 1 MG/1
TABLET ORAL
Qty: 60 TABLET | Refills: 3 | Status: SHIPPED | OUTPATIENT
Start: 2017-11-15 | End: 2018-06-06

## 2017-11-21 LAB — INR PPP: 1.9

## 2017-11-22 ENCOUNTER — ANTICOAGULATION VISIT (OUTPATIENT)
Dept: CARDIOLOGY | Facility: CLINIC | Age: 82
End: 2017-11-22

## 2017-11-29 ENCOUNTER — ANTICOAGULATION VISIT (OUTPATIENT)
Dept: CARDIOLOGY | Facility: CLINIC | Age: 82
End: 2017-11-29

## 2017-11-29 LAB — INR PPP: 1.6

## 2017-12-06 ENCOUNTER — OFFICE VISIT (OUTPATIENT)
Dept: CARDIOLOGY | Age: 82
End: 2017-12-06

## 2017-12-06 ENCOUNTER — ANTICOAGULATION VISIT (OUTPATIENT)
Dept: CARDIOLOGY | Facility: CLINIC | Age: 82
End: 2017-12-06

## 2017-12-06 VITALS
SYSTOLIC BLOOD PRESSURE: 119 MMHG | BODY MASS INDEX: 22.66 KG/M2 | HEIGHT: 66 IN | WEIGHT: 141 LBS | HEART RATE: 76 BPM | DIASTOLIC BLOOD PRESSURE: 75 MMHG

## 2017-12-06 DIAGNOSIS — I42.0 DILATED CARDIOMYOPATHY (HCC): ICD-10-CM

## 2017-12-06 DIAGNOSIS — I48.19 PERSISTENT ATRIAL FIBRILLATION (HCC): Primary | ICD-10-CM

## 2017-12-06 DIAGNOSIS — I44.7 LEFT BUNDLE BRANCH BLOCK (LBBB): ICD-10-CM

## 2017-12-06 DIAGNOSIS — I35.0 NONRHEUMATIC AORTIC VALVE STENOSIS: ICD-10-CM

## 2017-12-06 DIAGNOSIS — I48.21 PERMANENT ATRIAL FIBRILLATION (HCC): ICD-10-CM

## 2017-12-06 LAB — INR PPP: 1.6

## 2017-12-06 PROCEDURE — 93000 ELECTROCARDIOGRAM COMPLETE: CPT | Performed by: INTERNAL MEDICINE

## 2017-12-06 PROCEDURE — 99213 OFFICE O/P EST LOW 20 MIN: CPT | Performed by: INTERNAL MEDICINE

## 2017-12-06 NOTE — PROGRESS NOTES
" Subjective:       Felecia Hinkle is a 89 y.o. female who here for follow up    CC  Follow-up for anticoagulation atrial fibrillation  HPI     89-year-old female with known history of atrial fibrillation left bundle branch block and aortic stenosis here for the follow-up    Felecia Hinkle has been complaining of the shortness of breath mild-to-moderate in intensity with mild-to-moderate usually relieved with rest associated with anxiety and fatigue    Problem List Items Addressed This Visit        Cardiovascular and Mediastinum    Permanent atrial fibrillation    Cardiomyopathy    Left bundle branch block (LBBB)    Aortic stenosis      Other Visit Diagnoses     Persistent atrial fibrillation    -  Primary    Relevant Orders    ECG 12 Lead        .    The following portions of the patient's history were reviewed and updated as appropriate: allergies, current medications, past family history, past medical history, past social history, past surgical history and problem list.    Past Medical History:   Diagnosis Date   • A-fib    • Acute myocardial infarction    • Congestive heart failure    • Diverticulitis    • Malignant neoplasm of breast    • Shingles    • Vertigo     reports that she has quit smoking. She does not have any smokeless tobacco history on file. She reports that she does not drink alcohol or use illicit drugs.  Family History   Problem Relation Age of Onset   • Thyroid disease Other    • Stroke Other      CEREBROVASCULAR ACCIDENT   • Heart disease Father    • Heart attack Father    • Hyperlipidemia Brother    • Heart disease Brother    • Heart attack Brother    • Stroke Brother        Review of Systems  Constitutional: No wt loss, fever, fatigue  Gastrointestinal: No nausea, abdominal pain  Behavioral/Psych: No insomnia or anxiety   Cardiovascular Shortness of breath  Objective:       Physical Exam             Physical Exam  /75  Pulse 76  Ht 167.6 cm (66\")  Wt 64 kg (141 lb)  BMI 22.76 " kg/m2    General appearance: NAD, conversant   Eyes: anicteric sclerae, moist conjunctivae; no lid-lag; PERRLA   HENT: Atraumatic; oropharynx clear with moist mucous membranes and no mucosal ulcerations;  normal hard and soft palate   Neck: Trachea midline; FROM, supple, no thyromegaly or lymphadenopathy   Lungs: CTA, with normal respiratory effort and no intercostal retractions   CV: S1-S2 regular, no murmurs, no rub, no gallop   Abdomen: Soft, non-tender; no masses or HSM   Extremities: No peripheral edema or extremity lymphadenopathy  Skin: Normal temperature, turgor and texture; no rash, ulcers or subcutaneous nodules   Psych: Appropriate affect, alert and oriented to person, place and time           Cardiographics  @  ECG 12 Lead  Date/Time: 12/6/2017 2:32 PM  Performed by: CB HAMLIN  Authorized by: CB HAMLIN   Comparison: compared with previous ECG   Similar to previous ECG  Rhythm: atrial fibrillation  ST Flattening: all  Clinical impression: abnormal ECG            Echocardiogram:        Current Outpatient Prescriptions:   •  acetaminophen (TYLENOL) 325 MG tablet, Take 500 mg by mouth 3 (Three) Times a Day. TAKE 1 TO 2 TABLETS EVERY 6 HOURS AS NEEDED., Disp: , Rfl:   •  Ascorbic Acid (VITAMIN C PO), Take by mouth., Disp: , Rfl:   •  Calcium Carb-Cholecalciferol (CALCIUM 600 + D PO), Take 1 tablet by mouth 2 (two) times a day., Disp: , Rfl:   •  carvedilol (COREG) 12.5 MG tablet, Take 1 tablet (12.5 mg total) by mouth 2 (two) times a day., Disp: , Rfl:   •  celecoxib (CeleBREX) 200 MG capsule, Take 1 capsule by mouth daily., Disp: , Rfl:   •  clopidogrel (PLAVIX) 75 MG tablet, Take 1 tablet by mouth daily., Disp: , Rfl:   •  escitalopram (LEXAPRO) 5 MG tablet, , Disp: , Rfl:   •  furosemide (LASIX) 20 MG tablet, Take 1 tablet by mouth daily. 20mg in am and 10mg at night , Disp: , Rfl:   •  Glucosamine-Chondroit-Vit C-Mn (GLUCOSAMINE CHONDR 1500 COMPLX) capsule, Take 1 capsule by mouth  2 (two) times a day., Disp: , Rfl:   •  rosuvastatin (CRESTOR) 10 MG tablet, Take 20 mg by mouth. Twice monthly  1st and 15th, Disp: , Rfl:   •  spironolactone (ALDACTONE) 25 MG tablet, Take 0.5 tablets (12.5 mg total) by mouth daily., Disp: 45 tablet, Rfl: 2  •  warfarin (COUMADIN) 1 MG tablet, Take 1mg on Friday and 2mg all other days or as instructed for INR 2-3., Disp: 60 tablet, Rfl: 3  •  warfarin (COUMADIN) 2 MG tablet, Take 1 tablet by mouth Daily., Disp: 90 tablet, Rfl: 1  •  aluminum-magnesium hydroxide-simethicone (MAALOX/MYLANTA) 200-200-20 MG/5ML suspension, Take  by mouth As Needed., Disp: , Rfl:   •  nitroglycerin (NITROSTAT) 0.4 MG SL tablet, Place under the tongue., Disp: , Rfl:    Assessment:        Patient Active Problem List   Diagnosis   • Permanent atrial fibrillation   • Cardiomyopathy   • Chest pain   • Cor pulmonale   • Dizziness   • Accumulation of fluid in tissues   • Fatigue   • Imbalance   • Left bundle branch block (LBBB)   • Feeling faint   • Non-rheumatic mitral regurgitation   • Finger numbness   • Hypertensive pulmonary vascular disease   • Apnea, sleep   • Aortic stenosis   • Coronary artery disease   • Congestive heart failure   • Acute ischemic heart disease   • Chronic kidney disease   • Acute on chronic diastolic heart failure   • Acute respiratory failure with hypoxia   • Encounter for therapeutic drug level monitoring   • Other specified abnormal findings of blood chemistry   • Elevated troponin I level   • Hyperkalemia   • Hypertension   • Hypoxemia   • Non-rheumatic tricuspid valve insufficiency         Interpretation Summary   · Right ventricular cavity is moderately dilated.  · Mildly reduced right ventricular systolic function noted.  · Moderate mitral valve regurgitation is present  · Left atrial cavity size is severely dilated.  · Moderate tricuspid valve regurgitation is present.  · Left ventricular systolic function is low normal. Estimated EF = 50%.  · Left  ventricular wall thickness is consistent with mild concentric hypertrophy.  · Severe pulmonary hypertension. RVSP equals 82 mmHg           Plan:            ICD-10-CM ICD-9-CM   1. Persistent atrial fibrillation I48.1 427.31   2. Permanent atrial fibrillation I48.2 427.31   3. Dilated cardiomyopathy I42.0 425.4   4. Left bundle branch block (LBBB) I44.7 426.3   5. Nonrheumatic aortic valve stenosis I35.0 424.1     1. Persistent atrial fibrillation  Rate well-controlled  - ECG 12 Lead        3. Dilated cardiomyopathy  Well compensated    4. Left bundle branch block (LBBB)  No change    5. Nonrheumatic aortic valve stenosis  Treat conservatively     Extra coumadin for inr 1.6    See us 6 months  COUNSELING:    Felecia Monroe was given to patient for the following topics: diagnostic results, risk factor reductions, impressions, risks and benefits of treatment options and importance of treatment compliance .       SMOKING COUNSELING:    Counseling given: Not Answered      EMR Dragon/Transcription disclaimer:   Much of this encounter note is an electronic transcription/translation of spoken language to printed text. The electronic translation of spoken language may permit erroneous, or at times, nonsensical words or phrases to be inadvertently transcribed; Although I have reviewed the note for such errors, some may still exist.

## 2017-12-13 ENCOUNTER — ANTICOAGULATION VISIT (OUTPATIENT)
Dept: CARDIOLOGY | Facility: CLINIC | Age: 82
End: 2017-12-13

## 2017-12-13 LAB — INR PPP: 1.6

## 2017-12-20 ENCOUNTER — ANTICOAGULATION VISIT (OUTPATIENT)
Dept: CARDIOLOGY | Facility: CLINIC | Age: 82
End: 2017-12-20

## 2017-12-20 LAB — INR PPP: 2.7

## 2017-12-20 NOTE — PROGRESS NOTES
[facility]oke with and[facility] at [facility] instructed on coumadin dosage and recheck in one week   Requested it be faxed [phone]

## 2017-12-28 LAB — INR PPP: 2.2

## 2017-12-29 ENCOUNTER — ANTICOAGULATION VISIT (OUTPATIENT)
Dept: CARDIOLOGY | Facility: CLINIC | Age: 82
End: 2017-12-29

## 2018-01-03 LAB — INR PPP: 2

## 2018-01-04 ENCOUNTER — ANTICOAGULATION VISIT (OUTPATIENT)
Dept: CARDIOLOGY | Facility: CLINIC | Age: 83
End: 2018-01-04

## 2018-01-10 ENCOUNTER — ANTICOAGULATION VISIT (OUTPATIENT)
Dept: CARDIOLOGY | Facility: CLINIC | Age: 83
End: 2018-01-10

## 2018-01-10 LAB — INR PPP: 1.2

## 2018-01-17 ENCOUNTER — ANTICOAGULATION VISIT (OUTPATIENT)
Dept: CARDIOLOGY | Facility: CLINIC | Age: 83
End: 2018-01-17

## 2018-01-17 LAB — INR PPP: 2.9

## 2018-01-24 LAB — INR PPP: 3

## 2018-01-25 ENCOUNTER — ANTICOAGULATION VISIT (OUTPATIENT)
Dept: CARDIOLOGY | Facility: CLINIC | Age: 83
End: 2018-01-25

## 2018-01-31 LAB — INR PPP: 2.7

## 2018-02-02 ENCOUNTER — ANTICOAGULATION VISIT (OUTPATIENT)
Dept: CARDIOLOGY | Facility: CLINIC | Age: 83
End: 2018-02-02

## 2018-02-06 ENCOUNTER — ANTICOAGULATION VISIT (OUTPATIENT)
Dept: CARDIOLOGY | Facility: CLINIC | Age: 83
End: 2018-02-06

## 2018-02-06 LAB — INR PPP: 2.6

## 2018-02-14 LAB — INR PPP: 1.9

## 2018-02-15 ENCOUNTER — ANTICOAGULATION VISIT (OUTPATIENT)
Dept: CARDIOLOGY | Facility: CLINIC | Age: 83
End: 2018-02-15

## 2018-02-19 ENCOUNTER — EPISODE CHANGES (OUTPATIENT)
Dept: CASE MANAGEMENT | Facility: OTHER | Age: 83
End: 2018-02-19

## 2018-02-21 ENCOUNTER — ANTICOAGULATION VISIT (OUTPATIENT)
Dept: CARDIOLOGY | Facility: CLINIC | Age: 83
End: 2018-02-21

## 2018-02-21 LAB — INR PPP: 1.4

## 2018-02-21 NOTE — PROGRESS NOTES
Take total of 4mg today and tomorrow then resume routine dosage of 1 mg on Fri; 2 mg all other days. Recheck in one week.

## 2018-02-21 NOTE — PROGRESS NOTES
Take 4mg today February 21, 2018 and 4mg on Thursday February 22, 2018. Then resume normal dosage of

## 2018-02-28 ENCOUNTER — ANTICOAGULATION VISIT (OUTPATIENT)
Dept: CARDIOLOGY | Facility: CLINIC | Age: 83
End: 2018-02-28

## 2018-02-28 LAB — INR PPP: 1.3

## 2018-03-07 ENCOUNTER — ANTICOAGULATION VISIT (OUTPATIENT)
Dept: CARDIOLOGY | Facility: CLINIC | Age: 83
End: 2018-03-07

## 2018-03-07 LAB — INR PPP: 2.1

## 2018-03-08 ENCOUNTER — APPOINTMENT (OUTPATIENT)
Dept: GENERAL RADIOLOGY | Facility: HOSPITAL | Age: 83
End: 2018-03-08

## 2018-03-08 ENCOUNTER — APPOINTMENT (OUTPATIENT)
Dept: CT IMAGING | Facility: HOSPITAL | Age: 83
End: 2018-03-08

## 2018-03-08 ENCOUNTER — HOSPITAL ENCOUNTER (EMERGENCY)
Facility: HOSPITAL | Age: 83
Discharge: HOME | End: 2018-03-08
Attending: EMERGENCY MEDICINE | Admitting: EMERGENCY MEDICINE

## 2018-03-08 VITALS
HEIGHT: 68 IN | BODY MASS INDEX: 22.73 KG/M2 | HEART RATE: 56 BPM | TEMPERATURE: 98.2 F | WEIGHT: 150 LBS | RESPIRATION RATE: 17 BRPM | OXYGEN SATURATION: 98 % | DIASTOLIC BLOOD PRESSURE: 74 MMHG | SYSTOLIC BLOOD PRESSURE: 151 MMHG

## 2018-03-08 DIAGNOSIS — S00.83XA CONTUSION OF FACE, INITIAL ENCOUNTER: ICD-10-CM

## 2018-03-08 DIAGNOSIS — S80.11XA CONTUSION OF RIGHT LOWER EXTREMITY, INITIAL ENCOUNTER: ICD-10-CM
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
## 2018-03-08 LAB
INR PPP: 2.34 (ref 0.9–1.1)
PROTHROMBIN TIME: 25.2 SECONDS (ref 11.7–14.2)

## 2018-03-08 PROCEDURE — 70486 CT MAXILLOFACIAL W/O DYE: CPT

## 2018-03-08 PROCEDURE — 36415 COLL VENOUS BLD VENIPUNCTURE: CPT

## 2018-03-08 PROCEDURE — 99283 EMERGENCY DEPT VISIT LOW MDM: CPT

## 2018-03-08 PROCEDURE — 73590 X-RAY EXAM OF LOWER LEG: CPT

## 2018-03-08 PROCEDURE — 85610 PROTHROMBIN TIME: CPT | Performed by: EMERGENCY MEDICINE

## 2018-03-08 PROCEDURE — 73560 X-RAY EXAM OF KNEE 1 OR 2: CPT

## 2018-03-08 PROCEDURE — 70450 CT HEAD/BRAIN W/O DYE: CPT

## 2018-03-08 NOTE — ED NOTES
Pt lives at assisted living. Pt was attempting to move from her wheelchair to dining table. Her legs gave out and pt struck her head on table. Pt is on Coumadin and Plavix. Has tenderness and swelling to right ckeek and hematoma to RLE. Pt is A & Ox3.      Tasneem Naranjo RN  03/08/18 [id number]

## 2018-03-08 NOTE — ED TRIAGE NOTES
Pt was transferring from a wheelchair to the chair in the dining room when she fell. Pt hit her right cheek on the table and has a hematoma to her right lower leg. Denies LOC. Pt appears to be in NAD at this time, skin is PWD, and breathing is even and unlabored. Bruising is noted to her right cheek and right lower leg.

## 2018-03-08 NOTE — DISCHARGE INSTRUCTIONS
Contusion  A contusion is a deep bruise. Contusions happen when an injury causes bleeding under the skin. Symptoms of bruising include pain, swelling, and discolored skin. The skin may turn blue, purple, or yellow.  Follow these instructions at home:  · Rest the injured area.  · If told, put ice on the injured area.  ¨ Put ice in a plastic bag.  ¨ Place a towel between your skin and the bag.  ¨ Leave the ice on for 20 minutes, 2-3 times per day.  · If told, put light pressure (compression) on the injured area using an elastic bandage. Make sure the bandage is not too tight. Remove it and put it back on as told by your doctor.  · If possible, raise (elevate) the injured area above the level of your heart while you are sitting or lying down.  · Take over-the-counter and prescription medicines only as told by your doctor.  Contact a doctor if:  · Your symptoms do not get better after several days of treatment.  · Your symptoms get worse.  · You have trouble moving the injured area.  Get help right away if:  · You have very bad pain.  · You have a loss of feeling (numbness) in a hand or foot.  · Your hand or foot turns pale or cold.  This information is not intended to replace advice given to you by your health care provider. Make sure you discuss any questions you have with your health care provider.  Document Released: 06/05/2009 Document Revised: 05/25/2017 Document Reviewed: 05/04/2016  [employer] Interactive Patient Education © 2017 [employer]

## 2018-03-08 NOTE — ED PROVIDER NOTES
" EMERGENCY DEPARTMENT ENCOUNTER    CHIEF COMPLAINT  Chief Complaint: Fall  History given by: Pt  History limited by: Vague historian  Room Number: 51/51  PMD: Manjula Delacruz MD      HPI:  Pt is a 89 y.o. female who presents via ambulance from [facility] complaining of injuries sustained in a mechanical fall that occured today. Pt reports right cheek pain and right leg pain. Per EMS, there was no LOC.  Pt does not remember when that occurred or how long she has been having dizziness.  Pt denies black or bloody stool.      Duration:  PTA  Onset: Sudden  Timing: Constant  Location: Right cheek and right leg  Radiation: None  Quality: \"soreness\"  Intensity/Severity: Moderate  Progression: Unchanged  Associated Symptoms: None  Aggravating Factors: None  Alleviating Factors: None  Previous Episodes: Unknown  Treatment before arrival: None    PAST MEDICAL HISTORY  Active Ambulatory Problems     Diagnosis Date Noted   • Permanent atrial fibrillation 01/29/2016   • Cardiomyopathy 01/29/2016   • Chest pain 01/29/2016   • Cor pulmonale 01/29/2016   • Dizziness 01/29/2016   • Accumulation of fluid in tissues 01/29/2016   • Fatigue 01/29/2016   • Imbalance 01/29/2016   • Left bundle branch block (LBBB) 01/29/2016   • Feeling faint 01/29/2016   • Non-rheumatic mitral regurgitation 01/29/2016   • Finger numbness 01/29/2016   • Hypertensive pulmonary vascular disease 01/29/2016   • Apnea, sleep 01/29/2016   • Aortic stenosis 01/29/2016   • Coronary artery disease 01/29/2016   • Congestive heart failure 01/29/2016   • Acute ischemic heart disease 01/29/2016   • Chronic kidney disease 01/01/2017   • Acute on chronic diastolic heart failure 03/28/2017   • Acute respiratory failure with hypoxia 03/28/2017   • Encounter for therapeutic drug level monitoring 03/28/2017   • Other specified abnormal findings of blood chemistry 03/29/2017   • Elevated troponin I level 03/29/2017   • Hyperkalemia 03/28/2017   • Hypertension 06/06/2017   • Hypoxemia " 03/29/2017   • Non-rheumatic tricuspid valve insufficiency 03/29/2017     Resolved Ambulatory Problems     Diagnosis Date Noted   • Aortic heart valve narrowing 01/29/2016     Past Medical History:   Diagnosis Date   • A-fib    • Acute myocardial infarction    • Congestive heart failure    • Diverticulitis    • Malignant neoplasm of breast    • Shingles    • Vertigo        PAST SURGICAL HISTORY  Past Surgical History:   Procedure Laterality Date   • CATARACT EXTRACTION     • CATARACT EXTRACTION     • CORONARY ANGIOPLASTY WITH STENT PLACEMENT     • HEMORRHOIDECTOMY     • THYROID SURGERY      SUB-TOTAL THYROIDECTOMY       FAMILY HISTORY  Family History   Problem Relation Age of Onset   • Thyroid disease Other    • Stroke Other      CEREBROVASCULAR ACCIDENT   • Heart disease Father    • Heart attack Father    • Hyperlipidemia Brother    • Heart disease Brother    • Heart attack Brother    • Stroke Brother        SOCIAL HISTORY  Social History     Social History   • Marital status:      Spouse name: N/A   • Number of children: N/A   • Years of education: N/A     Occupational History   • Not on file.     Social History Main Topics   • Smoking status: Former Smoker   • Smokeless tobacco: Not on file   • Alcohol use No   • Drug use: No   • Sexual activity: Defer     Other Topics Concern   • Not on file     Social History Narrative       ALLERGIES  Captopril; Amlodipine; Iodinated diagnostic agents; Nifedipine; Other; and Red dye    REVIEW OF SYSTEMS  Review of Systems   Constitutional: Negative for fever.   HENT: Negative for sore throat.         Cheek soreness   Eyes: Negative.    Respiratory: Negative for cough and shortness of breath.    Cardiovascular: Negative for chest pain.   Gastrointestinal: Negative for abdominal pain, diarrhea and vomiting.   Genitourinary: Negative for dysuria.   Musculoskeletal: Negative for neck pain.        Right leg   Skin: Negative for rash.   Allergic/Immunologic: Negative.     Neurological: Negative for weakness, numbness and headaches.   Hematological: Negative.    Psychiatric/Behavioral: Negative.    All other systems reviewed and are negative.      PHYSICAL EXAM  ED Triage Vitals   Temp Heart Rate Resp BP SpO2   -- 03/08/18 1242 03/08/18 1244 03/08/18 1242 03/08/18 1242    56 17 151/74 98 %      Temp src Heart Rate Source Patient Position BP Location FiO2 (%)   -- -- -- -- --              Physical Exam   Constitutional: She is well-developed, well-nourished, and in no distress. No distress.   HENT:   Head: Normocephalic and atraumatic.   Small contusion to right cheek.     Eyes: EOM are normal. Pupils are equal, round, and reactive to light.   Neck: Normal range of motion. Neck supple.   Cardiovascular: Normal rate, regular rhythm and normal heart sounds.    Pulmonary/Chest: Effort normal and breath sounds normal. No respiratory distress.   Abdominal: Soft. There is no tenderness. There is no rebound and no guarding.   Musculoskeletal: Normal range of motion. She exhibits no edema.   Small hematoma to right tib fib.     Neurological: She is alert. She has normal sensation. GCS score is 15.   Skin: Skin is warm and dry. No rash noted.   Psychiatric: Mood and affect normal.   Nursing note and vitals reviewed.      LAB RESULTS  Lab Results (last 24 hours)     Procedure Component Value Units Date/Time    Protime-INR [[id number]]  (Abnormal) Collected:  03/08/18 1350    Specimen:  Blood Updated:  03/08/18 1416     Protime 25.2 (H) Seconds      INR 2.34 (H)          I ordered the above labs and reviewed the results    RADIOLOGY  CT Head Without Contrast   Preliminary Result   1. No significant change when compared to prior noncontrast head CT from   [facility] 09/27/2017 with no acute abnormality seen.   2. There is moderate to severe small vessel disease in the cerebral   white matter and diffuse cerebral atrophy most pronounced in the   frontotemporal region. Prominent  calcified atherosclerotic plaques in   the distal right vertebral artery, the proximal basilar artery, and the   cavernous and supracavernous segments of the internal carotid arteries   bilaterally. The remainder of the head CT is within normal limits with   no acute skull fracture or intracranial hemorrhage seen.       FACIAL CT TECHNIQUE: Spiral CT images were obtained through the facial   bones in the axial imaging plane and images were reformatted and   submitted in 2 mm thick axial CT section with soft tissue and   high-resolution bone algorithm and additional 2 mm thick sagittal and   coronal reconstructions were performed with high-resolution bone   algorithm.       COMPARISON: There are no prior facial CTs for comparison.       FINDINGS: The paranasal sinuses are clear. There are arthritic changes   at the atlantodental interval. There is some facet arthropathy at C2-3   and C3-4, 2 mm anterolisthesis of C3 on C4. There is moderate to severe   disc space narrowing, degenerative disc and endplate changes at C4-5,   C5-6 and C6-7 with areas of canal and foraminal narrowing. The   nasopharynx, oropharynx and hypopharynx are normal in appearance. The   parotid, , parapharyngeal and submandibular spaces are   symmetric and normal in appearance. No acute facial fracture is   identified.       IMPRESSION:    There is some minimal subcutaneous swelling over the   right lateral aspect of the face likely minimal subcutaneous hematoma   from today's head and facial trauma.  Otherwise the facial CT is within   normal limits with no acute facial fracture identified.       Radiation dose reduction techniques were utilized, including automated   exposure control and exposure modulation based on body size.              CT Facial Bones Without Contrast   Preliminary Result   1. No significant change when compared to prior noncontrast head CT from   [facility] 09/27/2017 with no acute abnormality  seen.   2. There is moderate to severe small vessel disease in the cerebral   white matter and diffuse cerebral atrophy most pronounced in the   frontotemporal region. Prominent calcified atherosclerotic plaques in   the distal right vertebral artery, the proximal basilar artery, and the   cavernous and supracavernous segments of the internal carotid arteries   bilaterally. The remainder of the head CT is within normal limits with   no acute skull fracture or intracranial hemorrhage seen.       FACIAL CT TECHNIQUE: Spiral CT images were obtained through the facial   bones in the axial imaging plane and images were reformatted and   submitted in 2 mm thick axial CT section with soft tissue and   high-resolution bone algorithm and additional 2 mm thick sagittal and   coronal reconstructions were performed with high-resolution bone   algorithm.       COMPARISON: There are no prior facial CTs for comparison.       FINDINGS: The paranasal sinuses are clear. There are arthritic changes   at the atlantodental interval. There is some facet arthropathy at C2-3   and C3-4, 2 mm anterolisthesis of C3 on C4. There is moderate to severe   disc space narrowing, degenerative disc and endplate changes at C4-5,   C5-6 and C6-7 with areas of canal and foraminal narrowing. The   nasopharynx, oropharynx and hypopharynx are normal in appearance. The   parotid, , parapharyngeal and submandibular spaces are   symmetric and normal in appearance. No acute facial fracture is   identified.       IMPRESSION:    There is some minimal subcutaneous swelling over the   right lateral aspect of the face likely minimal subcutaneous hematoma   from today's head and facial trauma.  Otherwise the facial CT is within   normal limits with no acute facial fracture identified.       Radiation dose reduction techniques were utilized, including automated   exposure control and exposure modulation based on body size.              XR Tibia Fibula 2  View Right   Final Result      XR Knee 1 or 2 View Right    (Results Pending)        I ordered the above noted radiological studies. Interpreted by radiologist.  Reviewed by me in PACS.       PROCEDURES  Procedures      PROGRESS AND CONSULTS  ED Course       1447  XR studies were negative. Rechecked the patient and she is resting comfortably. Discussed pertinent lab and imaging results. Discussed the plan to discharge. Patient agrees with plan and all questions were addressed.       MEDICAL DECISION MAKING  Results were reviewed/discussed with the patient and they were also made aware of online access. Pt also made aware that some labs, such as cultures, will not be resulted during ER visit and follow up with PMD is necessary.     MDM  Number of Diagnoses or Management Options  Contusion of face, initial encounter:   Contusion of right lower extremity, initial encounter:   Fall, initial encounter:      Amount and/or Complexity of Data Reviewed  Clinical lab tests: reviewed  Tests in the radiology section of CPT®: reviewed and ordered (XR studies show nothing acute)    Patient Progress  Patient progress: stable         DIAGNOSIS  Final diagnoses:   Fall, initial encounter   Contusion of face, initial encounter   Contusion of right lower extremity, initial encounter       DISPOSITION  DISCHARGE    Patient discharged in stable condition.    Reviewed implications of results, diagnosis, meds, responsibility to follow up, warning signs and symptoms of possible worsening, potential complications and reasons to return to ER.    Patient/Family voiced understanding of above instructions.    Discussed plan for discharge, as there is no emergent indication for admission.  Pt/family is agreeable and understands need for follow up and repeat testing.  Pt is aware that discharge does not mean that nothing is wrong but it indicates no emergency is present that requires admission and they must continue care with follow-up as given  below or physician of their choice.     FOLLOW-UP  Manjula Delacruz MD  [address]  [address]  [facility] [id number] [phone]      As needed    [facility] Emergency Department  [address] [facility] [id number] [phone]    As needed         Medication List      Notice     No changes were made to your prescriptions during this visit.            Latest Documented Vital Signs:  As of 2:49 PM  BP- 151/74 HR- 56 Temp- 98.2 °F (36.8 °C) (Oral) O2 sat- 98%    --  Documentation assistance provided by toshia Pate for Dr. Mcneil.  Information recorded by the scribe was done at my direction and has been verified and validated by me.       Kamilah Pate  03/08/18 0703       Griffin Mcneil MD  03/08/18 2700

## 2018-03-14 ENCOUNTER — ANTICOAGULATION VISIT (OUTPATIENT)
Dept: CARDIOLOGY | Facility: CLINIC | Age: 83
End: 2018-03-14

## 2018-03-14 LAB — INR PPP: 2

## 2018-03-21 ENCOUNTER — ANTICOAGULATION VISIT (OUTPATIENT)
Dept: CARDIOLOGY | Facility: CLINIC | Age: 83
End: 2018-03-21

## 2018-03-21 LAB — INR PPP: 1.5

## 2018-03-28 LAB — INR PPP: 2.4

## 2018-03-29 ENCOUNTER — ANTICOAGULATION VISIT (OUTPATIENT)
Dept: CARDIOLOGY | Facility: CLINIC | Age: 83
End: 2018-03-29

## 2018-04-04 ENCOUNTER — ANTICOAGULATION VISIT (OUTPATIENT)
Dept: CARDIOLOGY | Facility: CLINIC | Age: 83
End: 2018-04-04

## 2018-04-04 LAB — INR PPP: 1.9

## 2018-04-11 ENCOUNTER — ANTICOAGULATION VISIT (OUTPATIENT)
Dept: CARDIOLOGY | Facility: CLINIC | Age: 83
End: 2018-04-11

## 2018-04-11 LAB — INR PPP: 2

## 2018-04-18 ENCOUNTER — ANTICOAGULATION VISIT (OUTPATIENT)
Dept: CARDIOLOGY | Facility: CLINIC | Age: 83
End: 2018-04-18

## 2018-04-18 LAB — INR PPP: 1.7

## 2018-04-18 NOTE — PATIENT INSTRUCTIONS
Take Warfarin 4mg today then resume normal dosage of Warfarin 1mg on Thursdays and Warfarin 2mg all other days. Recheck in one week on Wednesday 4/25/18.

## 2018-04-25 LAB — INR PPP: 1.7

## 2018-04-26 ENCOUNTER — ANTICOAGULATION VISIT (OUTPATIENT)
Dept: CARDIOLOGY | Facility: CLINIC | Age: 83
End: 2018-04-26

## 2018-04-26 NOTE — PATIENT INSTRUCTIONS
Please give Warfarin 4mg today and warfarin 2mg tomorrow. Then resume usual dosage of Warfarin 1mg on Friday and Warfarin 2mg all other days. Recheck INR 5/2/18 and call results to office [phone].

## 2018-05-02 ENCOUNTER — ANTICOAGULATION VISIT (OUTPATIENT)
Dept: CARDIOLOGY | Facility: CLINIC | Age: 83
End: 2018-05-02

## 2018-05-02 LAB — INR PPP: 2

## 2018-05-09 LAB — INR PPP: 2

## 2018-05-10 ENCOUNTER — ANTICOAGULATION VISIT (OUTPATIENT)
Dept: CARDIOLOGY | Facility: CLINIC | Age: 83
End: 2018-05-10

## 2018-05-10 ENCOUNTER — TELEPHONE (OUTPATIENT)
Dept: CARDIOLOGY | Facility: CLINIC | Age: 83
End: 2018-05-10

## 2018-05-10 NOTE — TELEPHONE ENCOUNTER
----- Message from Michell Smith sent at 5/10/2018 11:15 AM EDT -----  Regarding: LASIX ORDER- SHE HAS GAINED WEIGHT  AMPARO (NIECE) [phone]    [facility] TOLD HER SHE COULD INCREASE LASIX FOR WEIGHT GAIN PLEASE ADVISE  THANKS

## 2018-05-16 ENCOUNTER — ANTICOAGULATION VISIT (OUTPATIENT)
Dept: CARDIOLOGY | Facility: CLINIC | Age: 83
End: 2018-05-16

## 2018-05-16 LAB — INR PPP: 2.1

## 2018-05-23 ENCOUNTER — TELEPHONE (OUTPATIENT)
Dept: CARDIOLOGY | Facility: CLINIC | Age: 83
End: 2018-05-23

## 2018-05-23 LAB — INR PPP: 2.5

## 2018-05-23 NOTE — TELEPHONE ENCOUNTER
Requesting last office note and echo for patient care. Also wanting to verify Lasix dose and know what last labs showing kidney function. Current Cr 2 BUN 44. She has increased leg swelling and edema. Patient has appt 6/6 per Camila does not need to be seen sooner than 6/6. Also wanted to know if he was ok with patient being on Celebrex. Instructed that Dr RICHEY does not give approval for NSAID usage.

## 2018-05-24 ENCOUNTER — ANTICOAGULATION VISIT (OUTPATIENT)
Dept: CARDIOLOGY | Facility: CLINIC | Age: 83
End: 2018-05-24

## 2018-05-30 ENCOUNTER — HOSPITAL ENCOUNTER (EMERGENCY)
Facility: HOSPITAL | Age: 83
Discharge: HOME | End: 2018-05-30
Attending: EMERGENCY MEDICINE | Admitting: EMERGENCY MEDICINE

## 2018-05-30 ENCOUNTER — ANTICOAGULATION VISIT (OUTPATIENT)
Dept: CARDIOLOGY | Facility: CLINIC | Age: 83
End: 2018-05-30

## 2018-05-30 VITALS
SYSTOLIC BLOOD PRESSURE: 154 MMHG | TEMPERATURE: 96.9 F | DIASTOLIC BLOOD PRESSURE: 83 MMHG | RESPIRATION RATE: 16 BRPM | HEART RATE: 58 BPM | OXYGEN SATURATION: 100 % | HEIGHT: 67 IN | WEIGHT: 158 LBS | BODY MASS INDEX: 24.8 KG/M2

## 2018-05-30 DIAGNOSIS — K64.4 BLEEDING EXTERNAL HEMORRHOIDS: Primary | ICD-10-CM

## 2018-05-30 DIAGNOSIS — Z79.01 ANTICOAGULATED: ICD-10-CM

## 2018-05-30 DIAGNOSIS — E87.5 HYPERKALEMIA: ICD-10-CM

## 2018-05-30 LAB
ANION GAP SERPL CALCULATED.3IONS-SCNC: 16.1 MMOL/L
BASOPHILS # BLD AUTO: 0.04 10*3/MM3 (ref 0–0.2)
BASOPHILS NFR BLD AUTO: 0.6 % (ref 0–1.5)
BUN BLD-MCNC: 36 MG/DL (ref 8–23)
BUN/CREAT SERPL: 19.9 (ref 7–25)
CALCIUM SPEC-SCNC: 10.1 MG/DL (ref 8.2–9.6)
CHLORIDE SERPL-SCNC: 94 MMOL/L (ref 98–107)
CO2 SERPL-SCNC: 19.9 MMOL/L (ref 22–29)
CREAT BLD-MCNC: 1.81 MG/DL (ref 0.57–1)
DEPRECATED RDW RBC AUTO: 51.5 FL (ref 37–54)
EOSINOPHIL # BLD AUTO: 0.42 10*3/MM3 (ref 0–0.7)
EOSINOPHIL NFR BLD AUTO: 6 % (ref 0.3–6.2)
ERYTHROCYTE [DISTWIDTH] IN BLOOD BY AUTOMATED COUNT: 14.7 % (ref 11.7–13)
GFR SERPL CREATININE-BSD FRML MDRD: 26 ML/MIN/1.73
GLUCOSE BLD-MCNC: 94 MG/DL (ref 65–99)
HCT VFR BLD AUTO: 39.4 % (ref 35.6–45.5)
HGB BLD-MCNC: 13 G/DL (ref 11.9–15.5)
IMM GRANULOCYTES # BLD: 0.08 10*3/MM3 (ref 0–0.03)
IMM GRANULOCYTES NFR BLD: 1.1 % (ref 0–0.5)
INR PPP: 2.86 (ref 0.9–1.1)
LYMPHOCYTES # BLD AUTO: 0.72 10*3/MM3 (ref 0.9–4.8)
LYMPHOCYTES NFR BLD AUTO: 10.3 % (ref 19.6–45.3)
MCH RBC QN AUTO: 31.7 PG (ref 26.9–32)
MCHC RBC AUTO-ENTMCNC: 33 G/DL (ref 32.4–36.3)
MCV RBC AUTO: 96.1 FL (ref 80.5–98.2)
MONOCYTES # BLD AUTO: 0.63 10*3/MM3 (ref 0.2–1.2)
MONOCYTES NFR BLD AUTO: 9 % (ref 5–12)
NEUTROPHILS # BLD AUTO: 5.21 10*3/MM3 (ref 1.9–8.1)
NEUTROPHILS NFR BLD AUTO: 74.1 % (ref 42.7–76)
PLATELET # BLD AUTO: 170 10*3/MM3 (ref 140–500)
PMV BLD AUTO: 10.1 FL (ref 6–12)
POTASSIUM BLD-SCNC: 5.9 MMOL/L (ref 3.5–5.2)
PROTHROMBIN TIME: 29.5 SECONDS (ref 11.7–14.2)
RBC # BLD AUTO: 4.1 10*6/MM3 (ref 3.9–5.2)
SODIUM BLD-SCNC: 130 MMOL/L (ref 136–145)
WBC NRBC COR # BLD: 7.02 10*3/MM3 (ref 4.5–10.7)

## 2018-05-30 PROCEDURE — 99284 EMERGENCY DEPT VISIT MOD MDM: CPT

## 2018-05-30 PROCEDURE — 85610 PROTHROMBIN TIME: CPT | Performed by: EMERGENCY MEDICINE

## 2018-05-30 PROCEDURE — 80048 BASIC METABOLIC PNL TOTAL CA: CPT | Performed by: EMERGENCY MEDICINE

## 2018-05-30 PROCEDURE — 85025 COMPLETE CBC W/AUTO DIFF WBC: CPT | Performed by: EMERGENCY MEDICINE

## 2018-05-30 RX ORDER — AMOXICILLIN 500 MG/1
1000 CAPSULE ORAL 2 TIMES DAILY
COMMUNITY

## 2018-05-30 RX ORDER — SODIUM CHLORIDE 0.9 % (FLUSH) 0.9 %
10 SYRINGE (ML) INJECTION AS NEEDED
Status: DISCONTINUED | OUTPATIENT
Start: 2018-05-30 | End: 2018-05-30 | Stop reason: HOSPADM

## 2018-05-30 RX ORDER — CLINDAMYCIN HYDROCHLORIDE 300 MG/1
300 CAPSULE ORAL 3 TIMES DAILY
COMMUNITY

## 2018-05-30 NOTE — PROGRESS NOTES
Had bleeding hemorrhoid today and was instructed by ER MD to hold warfarin for 1 week. Was also placed on abx for UTI starting Saturday.

## 2018-06-06 ENCOUNTER — ANTICOAGULATION VISIT (OUTPATIENT)
Dept: CARDIOLOGY | Facility: CLINIC | Age: 83
End: 2018-06-06

## 2018-06-06 ENCOUNTER — OFFICE VISIT (OUTPATIENT)
Dept: CARDIOLOGY | Age: 83
End: 2018-06-06

## 2018-06-06 VITALS
HEIGHT: 67 IN | DIASTOLIC BLOOD PRESSURE: 69 MMHG | SYSTOLIC BLOOD PRESSURE: 124 MMHG | HEART RATE: 57 BPM | WEIGHT: 145 LBS | BODY MASS INDEX: 22.76 KG/M2

## 2018-06-06 DIAGNOSIS — I49.5 SSS (SICK SINUS SYNDROME) (HCC): Primary | ICD-10-CM

## 2018-06-06 DIAGNOSIS — I44.7 LEFT BUNDLE BRANCH BLOCK (LBBB): ICD-10-CM

## 2018-06-06 DIAGNOSIS — I48.21 PERMANENT ATRIAL FIBRILLATION (HCC): ICD-10-CM

## 2018-06-06 DIAGNOSIS — I10 ESSENTIAL HYPERTENSION: ICD-10-CM

## 2018-06-06 DIAGNOSIS — I42.0 DILATED CARDIOMYOPATHY (HCC): ICD-10-CM

## 2018-06-06 DIAGNOSIS — I24.9 ACUTE ISCHEMIC HEART DISEASE (HCC): ICD-10-CM

## 2018-06-06 LAB — INR PPP: 1.1

## 2018-06-06 PROCEDURE — 99213 OFFICE O/P EST LOW 20 MIN: CPT | Performed by: INTERNAL MEDICINE

## 2018-06-06 PROCEDURE — 93000 ELECTROCARDIOGRAM COMPLETE: CPT | Performed by: INTERNAL MEDICINE

## 2018-06-06 RX ORDER — ACETAMINOPHEN 500 MG
500 TABLET ORAL 3 TIMES DAILY
COMMUNITY

## 2018-06-06 NOTE — PROGRESS NOTES
Subjective:       Felecia Hinkle is a 89 y.o. female who here for follow up    CC  The follow-up for the atrial fibrillation left bundle branch block cardiomyopathy hypertension  HPI  89-year-old female with known history of atrial fibrillation, cardiomyopathy (5 coronary artery disease benign essential arterial hypertension and sick sinus syndrome here for the follow-up with no complaints of chest pains or tightness in chest     Problem List Items Addressed This Visit        Cardiovascular and Mediastinum    Permanent atrial fibrillation    Cardiomyopathy    Left bundle branch block (LBBB)    Acute ischemic heart disease    Hypertension    SSS (sick sinus syndrome) - Primary    Relevant Orders    ECG 12 Lead        .    The following portions of the patient's history were reviewed and updated as appropriate: allergies, current medications, past family history, past medical history, past social history, past surgical history and problem list.    Past Medical History:   Diagnosis Date   • A-fib    • Acute myocardial infarction    • Congestive heart failure    • Diverticulitis    • Gastric ulcer    • Hypertension    • Malignant neoplasm of breast    • Shingles    • Vertigo     reports that she has quit smoking. She does not have any smokeless tobacco history on file. She reports that she does not drink alcohol or use drugs.  Family History   Problem Relation Age of Onset   • Thyroid disease Other    • Stroke Other         CEREBROVASCULAR ACCIDENT   • Heart disease Father    • Heart attack Father    • Hyperlipidemia Brother    • Heart disease Brother    • Heart attack Brother    • Stroke Brother        Review of Systems  Constitutional: No wt loss, fever, fatigue  Gastrointestinal: No nausea, abdominal pain  Behavioral/Psych: No insomnia or anxiety   Cardiovascular No chest pains or tightness in chest  Objective:       Physical Exam           Physical Exam  /69 (BP Location: Left arm, Patient Position: Sitting)  "  Pulse 57   Ht 170.2 cm (67\")   Wt 65.8 kg (145 lb)   BMI 22.71 kg/m²     General appearance: NAD, conversant   Eyes: anicteric sclerae, moist conjunctivae; no lid-lag; PERRLA   HENT: Atraumatic; oropharynx clear with moist mucous membranes and no mucosal ulcerations;  normal hard and soft palate   Neck: Trachea midline; FROM, supple, no thyromegaly or lymphadenopathy   Lungs: CTA, with normal respiratory effort and no intercostal retractions   CV: S1-S2 regular, no murmurs, no rub, no gallop   Abdomen: Soft, non-tender; no masses or HSM   Extremities: No peripheral edema or extremity lymphadenopathy  Skin: Normal temperature, turgor and texture; no rash, ulcers or subcutaneous nodules   Psych: Appropriate affect, alert and oriented to person, place and time           Cardiographics  @  ECG 12 Lead  Date/Time: 6/6/2018 2:40 PM  Performed by: CB HAMLIN  Authorized by: CB HAMLIN   Comparison: compared with previous ECG   Comparison to previous ECG: Ventricular escape is new  Rhythm: atrial fibrillation  ST Flattening: all  Clinical impression: abnormal ECG            Echocardiogram:        Current Outpatient Prescriptions:   •  amoxicillin (AMOXIL) 500 MG capsule, Take 1,000 mg by mouth 2 (Two) Times a Day., Disp: , Rfl:   •  Calcium Carb-Cholecalciferol (CALCIUM 600 + D PO), Take 1 tablet by mouth 2 (two) times a day., Disp: , Rfl:   •  carvedilol (COREG) 12.5 MG tablet, Take 1 tablet (12.5 mg total) by mouth 2 (two) times a day., Disp: , Rfl:   •  celecoxib (CeleBREX) 200 MG capsule, Take 1 capsule by mouth daily., Disp: , Rfl:   •  clindamycin (CLEOCIN) 300 MG capsule, Take 300 mg by mouth 3 (Three) Times a Day., Disp: , Rfl:   •  clopidogrel (PLAVIX) 75 MG tablet, Take 1 tablet by mouth daily., Disp: , Rfl:   •  escitalopram (LEXAPRO) 5 MG tablet, , Disp: , Rfl:   •  furosemide (LASIX) 20 MG tablet, Take 1 tablet by mouth daily. 20mg in am and 10mg at night , Disp: , Rfl:   •  " nitroglycerin (NITROSTAT) 0.4 MG SL tablet, Place under the tongue., Disp: , Rfl:   •  spironolactone (ALDACTONE) 25 MG tablet, Take 0.5 tablets (12.5 mg total) by mouth daily., Disp: 45 tablet, Rfl: 2  •  rosuvastatin (CRESTOR) 10 MG tablet, Take 20 mg by mouth. Twice monthly  1st and 15th, Disp: , Rfl:   •  warfarin (COUMADIN) 1 MG tablet, Take 1mg on Friday and 2mg all other days or as instructed for INR 2-3., Disp: 60 tablet, Rfl: 3  •  warfarin (COUMADIN) 2 MG tablet, Take 1 tablet by mouth Daily., Disp: 90 tablet, Rfl: 1   Assessment:        Patient Active Problem List   Diagnosis   • Permanent atrial fibrillation   • Cardiomyopathy   • Chest pain   • Cor pulmonale   • Dizziness   • Accumulation of fluid in tissues   • Fatigue   • Imbalance   • Left bundle branch block (LBBB)   • Feeling faint   • Non-rheumatic mitral regurgitation   • Finger numbness   • Hypertensive pulmonary vascular disease   • Apnea, sleep   • Aortic stenosis   • Coronary artery disease   • Congestive heart failure   • Acute ischemic heart disease   • Chronic kidney disease   • Acute on chronic diastolic heart failure   • Acute respiratory failure with hypoxia   • Encounter for therapeutic drug level monitoring   • Other specified abnormal findings of blood chemistry   • Elevated troponin I level   • Hyperkalemia   • Hypertension   • Hypoxemia   • Non-rheumatic tricuspid valve insufficiency               Plan:            ICD-10-CM ICD-9-CM   1. SSS (sick sinus syndrome) I49.5 427.81   2. Permanent atrial fibrillation I48.2 427.31   3. Acute ischemic heart disease I24.9 410.90   4. Dilated cardiomyopathy I42.0 425.4   5. Left bundle branch block (LBBB) I44.7 426.3   6. Essential hypertension I10 401.9     1. SSS (sick sinus syndrome)  Under control  - ECG 12 Lead    2. Permanent atrial fibrillation  Controlled    3. Acute ischemic heart disease  No angina pectoralis    4. Dilated cardiomyopathy  Compensated    5. Left bundle branch block  (LBBB)  No change    6. Essential hypertension  Blood pressure under control       Off coumadin    Keep on plavix      Should have pacemaker    Family will make final decision  COUNSELING:    Felecia Monroe was given to patient for the following topics: diagnostic results, risk factor reductions, impressions, risks and benefits of treatment options and importance of treatment compliance .       SMOKING COUNSELING:    Counseling given: Yes      EMR Dragon/Transcription disclaimer:   Much of this encounter note is an electronic transcription/translation of spoken language to printed text. The electronic translation of spoken language may permit erroneous, or at times, nonsensical words or phrases to be inadvertently transcribed; Although I have reviewed the note for such errors, some may still exist.

## 2018-06-25 ENCOUNTER — APPOINTMENT (OUTPATIENT)
Dept: CT IMAGING | Facility: HOSPITAL | Age: 83
End: 2018-06-25

## 2018-06-25 ENCOUNTER — HOSPITAL ENCOUNTER (EMERGENCY)
Facility: HOSPITAL | Age: 83
Discharge: NURSING, REHAB OR LONG-TERM CARE | End: 2018-06-25
Attending: EMERGENCY MEDICINE | Admitting: EMERGENCY MEDICINE

## 2018-06-25 ENCOUNTER — APPOINTMENT (OUTPATIENT)
Dept: GENERAL RADIOLOGY | Facility: HOSPITAL | Age: 83
End: 2018-06-25

## 2018-06-25 VITALS
TEMPERATURE: 97.4 F | HEIGHT: 66 IN | HEART RATE: 71 BPM | DIASTOLIC BLOOD PRESSURE: 98 MMHG | SYSTOLIC BLOOD PRESSURE: 150 MMHG | OXYGEN SATURATION: 99 % | BODY MASS INDEX: 24.11 KG/M2 | RESPIRATION RATE: 18 BRPM | WEIGHT: 150 LBS
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
## 2018-06-25 DIAGNOSIS — M54.9 ACUTE UPPER BACK PAIN: ICD-10-CM

## 2018-06-25 DIAGNOSIS — S61.412A SKIN TEAR OF LEFT HAND WITHOUT COMPLICATION, INITIAL ENCOUNTER: ICD-10-CM

## 2018-06-25 DIAGNOSIS — S60.222A CONTUSION OF LEFT HAND, INITIAL ENCOUNTER: Primary | ICD-10-CM

## 2018-06-25 PROCEDURE — 70450 CT HEAD/BRAIN W/O DYE: CPT

## 2018-06-25 PROCEDURE — 73130 X-RAY EXAM OF HAND: CPT

## 2018-06-25 PROCEDURE — 72125 CT NECK SPINE W/O DYE: CPT

## 2018-06-25 PROCEDURE — 99284 EMERGENCY DEPT VISIT MOD MDM: CPT

## 2018-06-25 PROCEDURE — 72072 X-RAY EXAM THORAC SPINE 3VWS: CPT

## 2018-07-04 ENCOUNTER — APPOINTMENT (OUTPATIENT)
Dept: CT IMAGING | Facility: HOSPITAL | Age: 83
End: 2018-07-04

## 2018-07-04 ENCOUNTER — APPOINTMENT (OUTPATIENT)
Dept: GENERAL RADIOLOGY | Facility: HOSPITAL | Age: 83
End: 2018-07-04

## 2018-07-04 ENCOUNTER — HOSPITAL ENCOUNTER (EMERGENCY)
Facility: HOSPITAL | Age: 83
Discharge: HOME | End: 2018-07-04
Attending: EMERGENCY MEDICINE | Admitting: EMERGENCY MEDICINE

## 2018-07-04 VITALS
RESPIRATION RATE: 16 BRPM | TEMPERATURE: 97.6 F | DIASTOLIC BLOOD PRESSURE: 95 MMHG | HEART RATE: 62 BPM | SYSTOLIC BLOOD PRESSURE: 173 MMHG | OXYGEN SATURATION: 97 %

## 2018-07-04 DIAGNOSIS — M19.011 GLENOHUMERAL ARTHRITIS, RIGHT: ICD-10-CM
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
## 2018-07-04 PROCEDURE — 99283 EMERGENCY DEPT VISIT LOW MDM: CPT

## 2018-07-04 PROCEDURE — 70450 CT HEAD/BRAIN W/O DYE: CPT

## 2018-07-04 PROCEDURE — 73030 X-RAY EXAM OF SHOULDER: CPT

## 2018-07-04 PROCEDURE — 72125 CT NECK SPINE W/O DYE: CPT

## 2018-07-04 NOTE — ED TRIAGE NOTES
Pt was found on floor at NH. Pt was found wrapped in all of her blankets as if she laid down on floor. NH unsure if pt fell or what happened. Pt has hx of dementia. Pt has no obvious injuries per EMS.

## 2018-07-04 NOTE — ED PROVIDER NOTES
EMERGENCY DEPARTMENT ENCOUNTER    Room Number:  20/20  Date seen:  2018  Time seen: 4:57 PM  PCP: Brad Brink MD  Historian: Patient's nieces (limited due to pt's dementia)      HPI:  Chief Complaint: Fall  Context: Felecia Hinkle is a 89 y.o. female who presents from [facility] to the ED c/o unwitnessed fall that occurred PTA at ED. Pt was found down on the floor 45 minutes ago. Pt's daughter states NH called her and states pt has been complaining of R shoulder pain and back pain, unknown if hit head or LOC. Pt's family states nothing appears abnormal. Pt's nieces state pt appears orientated to baseline, and at baseline complains of shoulder and knee pain due to arthritis. Pt's niece states pt has hx of frequent falls and had similar fall 1 week ago with back pain. Pt's family states pt was taken off Coumadin 3 weeks ago.     Quality: fall  Intensity/Severity: mild  Duration: unknown how long down, found 45 minutes ago  Onset quality: sudden  Timin time event   Progression: improved  Aggravating Factors: unknown  Alleviating Factors: none  Previous Episodes: Pt has recent hx of falls  Treatment before arrival: none  Associated Symptoms: possible back pain, R shoulder pain.         PAST MEDICAL HISTORY  Active Ambulatory Problems     Diagnosis Date Noted   • Permanent atrial fibrillation (CMS/HCC) 2016   • Cardiomyopathy (CMS/HCC) 2016   • Chest pain 2016   • Cor pulmonale (CMS/HCC) 2016   • Dizziness 2016   • Accumulation of fluid in tissues 2016   • Fatigue 2016   • Imbalance 2016   • Left bundle branch block (LBBB) 2016   • Feeling faint 2016   • Non-rheumatic mitral regurgitation 2016   • Finger numbness 2016   • Hypertensive pulmonary vascular disease 2016   • Apnea, sleep 2016   • Aortic stenosis 2016   • Coronary artery disease 2016   • Congestive heart failure (CMS/HCC) 2016   • Acute ischemic  heart disease (CMS/HCC) 01/29/2016   • Chronic kidney disease 01/01/2017   • Acute on chronic diastolic heart failure (CMS/HCC) 03/28/2017   • Acute respiratory failure with hypoxia (CMS/HCC) 03/28/2017   • Encounter for therapeutic drug level monitoring 03/28/2017   • Other specified abnormal findings of blood chemistry 03/29/2017   • Elevated troponin I level 03/29/2017   • Hyperkalemia 03/28/2017   • Hypertension 06/06/2017   • Hypoxemia 03/29/2017   • Non-rheumatic tricuspid valve insufficiency 03/29/2017   • SSS (sick sinus syndrome) (CMS/HCC) 06/06/2018     Resolved Ambulatory Problems     Diagnosis Date Noted   • Aortic heart valve narrowing 01/29/2016     Past Medical History:   Diagnosis Date   • A-fib (CMS/HCC)    • Acute myocardial infarction    • Congestive heart failure (CMS/HCC)    • Diverticulitis    • Gastric ulcer    • Hypertension    • Malignant neoplasm of breast (CMS/HCC)    • Shingles    • Vertigo          PAST SURGICAL HISTORY  Past Surgical History:   Procedure Laterality Date   • CATARACT EXTRACTION     • CATARACT EXTRACTION     • CORONARY ANGIOPLASTY WITH STENT PLACEMENT     • HEMORRHOIDECTOMY     • THYROID SURGERY      SUB-TOTAL THYROIDECTOMY         FAMILY HISTORY  Family History   Problem Relation Age of Onset   • Thyroid disease Other    • Stroke Other         CEREBROVASCULAR ACCIDENT   • Heart disease Father    • Heart attack Father    • Hyperlipidemia Brother    • Heart disease Brother    • Heart attack Brother    • Stroke Brother          SOCIAL HISTORY  Social History     Social History   • Marital status:      Spouse name: N/A   • Number of children: N/A   • Years of education: N/A     Occupational History   • Not on file.     Social History Main Topics   • Smoking status: Former Smoker   • Smokeless tobacco: Not on file   • Alcohol use No   • Drug use: No   • Sexual activity: Defer     Other Topics Concern   • Not on file     Social History Narrative   • No narrative on file          ALLERGIES  Captopril; Amlodipine; Iodinated diagnostic agents; Nifedipine; Norvasc [amlodipine besylate]; and Red dye        REVIEW OF SYSTEMS  Review of Systems   Unable to perform ROS: Dementia   Musculoskeletal: Positive for arthralgias (R shoulder ) and back pain.            PHYSICAL EXAM  ED Triage Vitals [07/04/18 1649]   Temp Heart Rate Resp BP SpO2   97.6 °F (36.4 °C) 80 18 150/98 98 %      Temp src Heart Rate Source Patient Position BP Location FiO2 (%)   Oral Monitor -- -- --           GENERAL: not distressed  HENT: nares patent, head atraumatic  EYES: no scleral icterus  CV: regular rhythm, regular rate  RESPIRATORY: normal effort, no chest wall tenderness  ABDOMEN: soft and nontender  MUSCULOSKELETAL: no deformity, no focal C-, T-, or L- spine tenderness, no focal pain to extremities though diffuse pain to right shoulder, pelvis stable, 2+ bilateral lower extremity edema  NEURO: alert, RUBIO, FC  SKIN: warm, dry    Vital signs and nursing notes reviewed.      RADIOLOGY  CT Head Without Contrast   Preliminary Result   1.  Moderate-to-severe chronic small vessel ischemic white matter change   without evidence for acute intracranial abnormality.   2.  Multilevel cervical spondylosis and facet arthritis without interval   change or evidence for acute abnormality of the cervical spine.   Enlargement of the left lobe of the thyroid gland without change.       Radiation dose reduction techniques were utilized, including automated   exposure control and exposure modulation based on body size.              CT Cervical Spine Without Contrast   Preliminary Result   1.  Moderate-to-severe chronic small vessel ischemic white matter change   without evidence for acute intracranial abnormality.   2.  Multilevel cervical spondylosis and facet arthritis without interval   change or evidence for acute abnormality of the cervical spine.   Enlargement of the left lobe of the thyroid gland without change.      "  Radiation dose reduction techniques were utilized, including automated   exposure control and exposure modulation based on body size.              XR Shoulder 2+ View Right   There is severe right glenohumeral joint arthritis with cutoff  appearance of the humeral head and remodeling of the articular surfaces.   There is broadening of the glenoid articular surface. Findings appear  chronic and are similar to a previous PA and lateral chest January 2016  and are also similar to findings in the left shoulder. There is no  evidence for fracture or acute abnormality. Right axillary clips are  present. There is mild-to-moderate right AC joint arthritis.             Ordered the above noted radiological studies. Reviewed by me in PACS.     Procedures      MEDICATIONS GIVEN IN ER  Medications - No data to display    PROGRESS AND CONSULTS     1657: Upon pt exam, discussion about pt's case further with nieces. Pt's nieces state that pt is a non compliant and uncooperative pt and [facility] is \"unable to take care of her anymore\". Discussed plan to have family meet with [facility] for further discussion about outpatient care.     1709: CT C-spine and CT head ordered due to pt's unknown hitting head or LOC status. XR R shoulder ordered.     1712: Discussed pt's case with [facility] Wadsworth RN, who agreed to discuss outpatient options with pt's family.     1745: [facility] Wadsworth RN, spoke with patient's family and developed a plan for outpatient further care for pt.     1832: Pt rechecked and resting comfortably, family at bedside. Discussed results of CT and XR, without acute findings. Pt will be discharged into care of current assisted living. Pt's family understands and agrees with plan, all questions addressed.       MEDICAL DECISION MAKING      MDM  Number of Diagnoses or Management Options  Fall, initial encounter:   Glenohumeral arthritis, right:   Diagnosis management comments: DDx includes intracranial bleeding, cervical fracture, " How Did The Hair Loss Occur?: sudden in onset How Severe Is Your Hair Loss?: moderate right shoulder strain, right shoulder fracture. Family reports frequent falls by patient. I believe she is safe for d/c to assisted living.        Amount and/or Complexity of Data Reviewed  Tests in the radiology section of CPT®: reviewed (CT - no acute findings  XR - severe arthritis, no acute changes)           DIAGNOSIS  Final diagnoses:   Fall, initial encounter   Glenohumeral arthritis, right         DISPOSITION  DISCHARGE    Patient discharged in stable condition.    Reviewed implications of results, diagnosis, meds, responsibility to follow up, warning signs and symptoms of possible worsening, potential complications and reasons to return to ER.    Patient/Family voiced understanding of above instructions.    Discussed plan for discharge, as there is no emergent indication for admission. Patient referred to primary care provider for BP management due to today's BP. Pt/family is agreeable and understands need for follow up and repeat testing.  Pt is aware that discharge does not mean that nothing is wrong but it indicates no emergency is present that requires admission and they must continue care with follow-up as given below or physician of their choice.     FOLLOW-UP  Brad Brink MD  [address] [address]  [phone]    Call   If symptoms worsen         Medication List      No changes were made to your prescriptions during this visit.             Latest Documented Vital Signs:  As of 6:33 PM  BP- 161/89 HR- 80 Temp- 97.6 °F (36.4 °C) (Oral) O2 sat- 98%    --  Documentation assistance provided by toshia Buitrago for Dr. Carlos MD.  Information recorded by the scribjesus was done at my direction and has been verified and validated by me.           Kelle Buitrago  07/04/18 [id number]       Diego Gonzalez II, MD  07/04/18 [id number]

## 2018-07-16 ENCOUNTER — HOSPITAL ENCOUNTER (EMERGENCY)
Facility: HOSPITAL | Age: 83
Discharge: HOME | End: 2018-07-16
Attending: EMERGENCY MEDICINE | Admitting: EMERGENCY MEDICINE

## 2018-07-16 VITALS
DIASTOLIC BLOOD PRESSURE: 92 MMHG | BODY MASS INDEX: 24.11 KG/M2 | TEMPERATURE: 98.6 F | SYSTOLIC BLOOD PRESSURE: 142 MMHG | HEIGHT: 66 IN | WEIGHT: 150 LBS | OXYGEN SATURATION: 99 % | RESPIRATION RATE: 18 BRPM | HEART RATE: 80 BPM

## 2018-07-16 DIAGNOSIS — R29.6 UNWITNESSED FALL: Primary | ICD-10-CM

## 2018-07-16 PROCEDURE — 99283 EMERGENCY DEPT VISIT LOW MDM: CPT

## 2018-09-30 ENCOUNTER — LAB REQUISITION (OUTPATIENT)
Dept: LAB | Facility: HOSPITAL | Age: 83
End: 2018-09-30

## 2018-09-30 DIAGNOSIS — Z00.00 ROUTINE GENERAL MEDICAL EXAMINATION AT A HEALTH CARE FACILITY: ICD-10-CM

## 2018-09-30 LAB
ANION GAP SERPL CALCULATED.3IONS-SCNC: 18.6 MMOL/L
BASOPHILS # BLD AUTO: 0 10*3/MM3 (ref 0–0.2)
BASOPHILS NFR BLD AUTO: 0 % (ref 0–1.5)
BUN BLD-MCNC: 32 MG/DL (ref 8–23)
BUN/CREAT SERPL: 17.2 (ref 7–25)
CALCIUM SPEC-SCNC: 10.3 MG/DL (ref 8.2–9.6)
CHLORIDE SERPL-SCNC: 91 MMOL/L (ref 98–107)
CO2 SERPL-SCNC: 26.4 MMOL/L (ref 22–29)
CREAT BLD-MCNC: 1.86 MG/DL (ref 0.57–1)
DEPRECATED RDW RBC AUTO: 50.2 FL (ref 37–54)
EOSINOPHIL # BLD AUTO: 0.07 10*3/MM3 (ref 0–0.7)
EOSINOPHIL NFR BLD AUTO: 1.3 % (ref 0.3–6.2)
ERYTHROCYTE [DISTWIDTH] IN BLOOD BY AUTOMATED COUNT: 15 % (ref 11.7–13)
GFR SERPL CREATININE-BSD FRML MDRD: 25 ML/MIN/1.73
GLUCOSE BLD-MCNC: 108 MG/DL (ref 65–99)
HCT VFR BLD AUTO: 38.1 % (ref 35.6–45.5)
HGB BLD-MCNC: 12.4 G/DL (ref 11.9–15.5)
IMM GRANULOCYTES # BLD: 0.01 10*3/MM3 (ref 0–0.03)
IMM GRANULOCYTES NFR BLD: 0.2 % (ref 0–0.5)
LYMPHOCYTES # BLD AUTO: 0.94 10*3/MM3 (ref 0.9–4.8)
LYMPHOCYTES NFR BLD AUTO: 17.5 % (ref 19.6–45.3)
MCH RBC QN AUTO: 29.7 PG (ref 26.9–32)
MCHC RBC AUTO-ENTMCNC: 32.5 G/DL (ref 32.4–36.3)
MCV RBC AUTO: 91.1 FL (ref 80.5–98.2)
MONOCYTES # BLD AUTO: 0.77 10*3/MM3 (ref 0.2–1.2)
MONOCYTES NFR BLD AUTO: 14.3 % (ref 5–12)
NEUTROPHILS # BLD AUTO: 3.59 10*3/MM3 (ref 1.9–8.1)
NEUTROPHILS NFR BLD AUTO: 66.9 % (ref 42.7–76)
PLATELET # BLD AUTO: 92 10*3/MM3 (ref 140–500)
PMV BLD AUTO: 11.4 FL (ref 6–12)
POTASSIUM BLD-SCNC: 4.9 MMOL/L (ref 3.5–5.2)
RBC # BLD AUTO: 4.18 10*6/MM3 (ref 3.9–5.2)
SODIUM BLD-SCNC: 136 MMOL/L (ref 136–145)
WBC NRBC COR # BLD: 5.37 10*3/MM3 (ref 4.5–10.7)

## 2018-09-30 PROCEDURE — 85025 COMPLETE CBC W/AUTO DIFF WBC: CPT

## 2018-09-30 PROCEDURE — 80048 BASIC METABOLIC PNL TOTAL CA: CPT

## 2024-10-05 NOTE — TELEPHONE ENCOUNTER
Per dr loera ok to take extra lasix PRN for swelling  S/w Yareli gave instructions  Faxed to [facility]   - - -